# Patient Record
Sex: FEMALE | Race: WHITE | HISPANIC OR LATINO | Employment: UNEMPLOYED | URBAN - METROPOLITAN AREA
[De-identification: names, ages, dates, MRNs, and addresses within clinical notes are randomized per-mention and may not be internally consistent; named-entity substitution may affect disease eponyms.]

---

## 2017-03-03 ENCOUNTER — HOSPITAL ENCOUNTER (OUTPATIENT)
Dept: RADIOLOGY | Facility: MEDICAL CENTER | Age: 47
End: 2017-03-03
Attending: ORTHOPAEDIC SURGERY | Admitting: ORTHOPAEDIC SURGERY
Payer: MEDICAID

## 2017-03-03 DIAGNOSIS — Z01.810 PRE-OPERATIVE CARDIOVASCULAR EXAMINATION: ICD-10-CM

## 2017-03-03 DIAGNOSIS — Z01.811 PRE-OPERATIVE RESPIRATORY EXAMINATION: ICD-10-CM

## 2017-03-03 DIAGNOSIS — Z01.812 PRE-OPERATIVE LABORATORY EXAMINATION: ICD-10-CM

## 2017-03-03 LAB
ANION GAP SERPL CALC-SCNC: 7 MMOL/L (ref 0–11.9)
APPEARANCE UR: CLEAR
APTT PPP: 29.4 SEC (ref 24.7–36)
BASOPHILS # BLD AUTO: 0.7 % (ref 0–1.8)
BASOPHILS # BLD: 0.06 K/UL (ref 0–0.12)
BILIRUB UR QL STRIP.AUTO: NEGATIVE
BUN SERPL-MCNC: 12 MG/DL (ref 8–22)
CALCIUM SERPL-MCNC: 10.1 MG/DL (ref 8.5–10.5)
CHLORIDE SERPL-SCNC: 104 MMOL/L (ref 96–112)
CO2 SERPL-SCNC: 25 MMOL/L (ref 20–33)
COLOR UR: COLORLESS
CREAT SERPL-MCNC: 0.68 MG/DL (ref 0.5–1.4)
CULTURE IF INDICATED INDCX: NO UA CULTURE
EKG IMPRESSION: NORMAL
EOSINOPHIL # BLD AUTO: 0.06 K/UL (ref 0–0.51)
EOSINOPHIL NFR BLD: 0.7 % (ref 0–6.9)
ERYTHROCYTE [DISTWIDTH] IN BLOOD BY AUTOMATED COUNT: 41.1 FL (ref 35.9–50)
ERYTHROCYTE [SEDIMENTATION RATE] IN BLOOD BY WESTERGREN METHOD: 42 MM/HOUR (ref 0–20)
GFR SERPL CREATININE-BSD FRML MDRD: >60 ML/MIN/1.73 M 2
GLUCOSE SERPL-MCNC: 108 MG/DL (ref 65–99)
GLUCOSE UR STRIP.AUTO-MCNC: NEGATIVE MG/DL
HCT VFR BLD AUTO: 43.2 % (ref 37–47)
HGB BLD-MCNC: 14.5 G/DL (ref 12–16)
IMM GRANULOCYTES # BLD AUTO: 0.03 K/UL (ref 0–0.11)
IMM GRANULOCYTES NFR BLD AUTO: 0.3 % (ref 0–0.9)
INR PPP: 0.95 (ref 0.87–1.13)
KETONES UR STRIP.AUTO-MCNC: NEGATIVE MG/DL
LEUKOCYTE ESTERASE UR QL STRIP.AUTO: NEGATIVE
LYMPHOCYTES # BLD AUTO: 2.81 K/UL (ref 1–4.8)
LYMPHOCYTES NFR BLD: 31.1 % (ref 22–41)
MCH RBC QN AUTO: 29.8 PG (ref 27–33)
MCHC RBC AUTO-ENTMCNC: 33.6 G/DL (ref 33.6–35)
MCV RBC AUTO: 88.7 FL (ref 81.4–97.8)
MICRO URNS: NORMAL
MONOCYTES # BLD AUTO: 0.32 K/UL (ref 0–0.85)
MONOCYTES NFR BLD AUTO: 3.5 % (ref 0–13.4)
NEUTROPHILS # BLD AUTO: 5.75 K/UL (ref 2–7.15)
NEUTROPHILS NFR BLD: 63.7 % (ref 44–72)
NITRITE UR QL STRIP.AUTO: NEGATIVE
NRBC # BLD AUTO: 0 K/UL
NRBC BLD AUTO-RTO: 0 /100 WBC
PH UR STRIP.AUTO: 6 [PH]
PLATELET # BLD AUTO: 275 K/UL (ref 164–446)
PMV BLD AUTO: 8.9 FL (ref 9–12.9)
POTASSIUM SERPL-SCNC: 3.9 MMOL/L (ref 3.6–5.5)
PROT UR QL STRIP: NEGATIVE MG/DL
PROTHROMBIN TIME: 13 SEC (ref 12–14.6)
RBC # BLD AUTO: 4.87 M/UL (ref 4.2–5.4)
RBC UR QL AUTO: NEGATIVE
SCCMEC + MECA PNL NOSE NAA+PROBE: NEGATIVE
SCCMEC + MECA PNL NOSE NAA+PROBE: NEGATIVE
SODIUM SERPL-SCNC: 136 MMOL/L (ref 135–145)
SP GR UR STRIP.AUTO: 1.01
WBC # BLD AUTO: 9 K/UL (ref 4.8–10.8)

## 2017-03-03 PROCEDURE — 85610 PROTHROMBIN TIME: CPT

## 2017-03-03 PROCEDURE — 36415 COLL VENOUS BLD VENIPUNCTURE: CPT

## 2017-03-03 PROCEDURE — 87641 MR-STAPH DNA AMP PROBE: CPT

## 2017-03-03 PROCEDURE — 71020 DX-CHEST-2 VIEWS: CPT

## 2017-03-03 PROCEDURE — 87640 STAPH A DNA AMP PROBE: CPT | Mod: 59

## 2017-03-03 PROCEDURE — 85652 RBC SED RATE AUTOMATED: CPT

## 2017-03-03 PROCEDURE — 80048 BASIC METABOLIC PNL TOTAL CA: CPT

## 2017-03-03 PROCEDURE — 85025 COMPLETE CBC W/AUTO DIFF WBC: CPT

## 2017-03-03 PROCEDURE — 81003 URINALYSIS AUTO W/O SCOPE: CPT

## 2017-03-03 PROCEDURE — 85730 THROMBOPLASTIN TIME PARTIAL: CPT

## 2017-03-03 RX ORDER — GABAPENTIN 300 MG/1
300 CAPSULE ORAL 3 TIMES DAILY
COMMUNITY
End: 2022-12-20

## 2017-03-03 RX ORDER — IBUPROFEN 200 MG
400 TABLET ORAL EVERY 6 HOURS PRN
Status: ON HOLD | COMMUNITY
End: 2017-03-07

## 2017-03-04 NOTE — OR NURSING
"pre admit: Spoke with Shantelle at Dr Cheatham's office to clarify \"Total Joint\" order re Chlorhexidine showers and antibiotics per the total joint protocol. She said pt does not have to do the 3 day shower protocol and that the office will give her what thy want her to use. Instructed pt to call office on Monday if she has not heard from them re shower instructions. MRSA nasal swab is to be done at pre admit. Office to fax updated order re Joint Protocol changes for this patient  "

## 2017-03-07 ENCOUNTER — HOSPITAL ENCOUNTER (OUTPATIENT)
Facility: MEDICAL CENTER | Age: 47
End: 2017-03-07
Attending: ORTHOPAEDIC SURGERY | Admitting: ORTHOPAEDIC SURGERY
Payer: MEDICAID

## 2017-03-07 VITALS
WEIGHT: 172.84 LBS | OXYGEN SATURATION: 98 % | BODY MASS INDEX: 30.62 KG/M2 | HEART RATE: 76 BPM | TEMPERATURE: 98.7 F | SYSTOLIC BLOOD PRESSURE: 138 MMHG | RESPIRATION RATE: 14 BRPM | DIASTOLIC BLOOD PRESSURE: 88 MMHG | HEIGHT: 63 IN

## 2017-03-07 PROBLEM — D16.9 OSTEOCHONDROMA: Status: ACTIVE | Noted: 2017-03-07

## 2017-03-07 LAB
HCG UR QL: NEGATIVE
SP GR UR REFRACTOMETRY: 1.02

## 2017-03-07 PROCEDURE — 81025 URINE PREGNANCY TEST: CPT

## 2017-03-07 PROCEDURE — A4606 OXYGEN PROBE USED W OXIMETER: HCPCS | Performed by: ORTHOPAEDIC SURGERY

## 2017-03-07 PROCEDURE — 700111 HCHG RX REV CODE 636 W/ 250 OVERRIDE (IP)

## 2017-03-07 PROCEDURE — 160047 HCHG PACU  - EA ADDL 30 MINS PHASE II: Performed by: ORTHOPAEDIC SURGERY

## 2017-03-07 PROCEDURE — 700102 HCHG RX REV CODE 250 W/ 637 OVERRIDE(OP)

## 2017-03-07 PROCEDURE — 502240 HCHG MISC OR SUPPLY RC 0272: Performed by: ORTHOPAEDIC SURGERY

## 2017-03-07 PROCEDURE — 700101 HCHG RX REV CODE 250

## 2017-03-07 PROCEDURE — 160039 HCHG SURGERY MINUTES - EA ADDL 1 MIN LEVEL 3: Performed by: ORTHOPAEDIC SURGERY

## 2017-03-07 PROCEDURE — A9270 NON-COVERED ITEM OR SERVICE: HCPCS

## 2017-03-07 PROCEDURE — 160002 HCHG RECOVERY MINUTES (STAT): Performed by: ORTHOPAEDIC SURGERY

## 2017-03-07 PROCEDURE — 110371 HCHG SHELL REV 272: Performed by: ORTHOPAEDIC SURGERY

## 2017-03-07 PROCEDURE — 88305 TISSUE EXAM BY PATHOLOGIST: CPT

## 2017-03-07 PROCEDURE — 110382 HCHG SHELL REV 271: Performed by: ORTHOPAEDIC SURGERY

## 2017-03-07 PROCEDURE — 160048 HCHG OR STATISTICAL LEVEL 1-5: Performed by: ORTHOPAEDIC SURGERY

## 2017-03-07 PROCEDURE — 160035 HCHG PACU - 1ST 60 MINS PHASE I: Performed by: ORTHOPAEDIC SURGERY

## 2017-03-07 PROCEDURE — 160009 HCHG ANES TIME/MIN: Performed by: ORTHOPAEDIC SURGERY

## 2017-03-07 PROCEDURE — 160036 HCHG PACU - EA ADDL 30 MINS PHASE I: Performed by: ORTHOPAEDIC SURGERY

## 2017-03-07 PROCEDURE — 160028 HCHG SURGERY MINUTES - 1ST 30 MINS LEVEL 3: Performed by: ORTHOPAEDIC SURGERY

## 2017-03-07 PROCEDURE — 501838 HCHG SUTURE GENERAL: Performed by: ORTHOPAEDIC SURGERY

## 2017-03-07 PROCEDURE — 500891 HCHG PACK, ORTHO MAJOR: Performed by: ORTHOPAEDIC SURGERY

## 2017-03-07 PROCEDURE — 160025 RECOVERY II MINUTES (STATS): Performed by: ORTHOPAEDIC SURGERY

## 2017-03-07 PROCEDURE — 88311 DECALCIFY TISSUE: CPT

## 2017-03-07 PROCEDURE — 160046 HCHG PACU - 1ST 60 MINS PHASE II: Performed by: ORTHOPAEDIC SURGERY

## 2017-03-07 PROCEDURE — 500112 HCHG BONE WAX: Performed by: ORTHOPAEDIC SURGERY

## 2017-03-07 PROCEDURE — 500054 HCHG BANDAGE, ELASTIC 6: Performed by: ORTHOPAEDIC SURGERY

## 2017-03-07 RX ORDER — CELECOXIB 200 MG/1
CAPSULE ORAL
Status: COMPLETED
Start: 2017-03-07 | End: 2017-03-07

## 2017-03-07 RX ORDER — LIDOCAINE HYDROCHLORIDE 10 MG/ML
INJECTION, SOLUTION INFILTRATION; PERINEURAL
Status: COMPLETED
Start: 2017-03-07 | End: 2017-03-07

## 2017-03-07 RX ORDER — GABAPENTIN 300 MG/1
CAPSULE ORAL
Status: COMPLETED
Start: 2017-03-07 | End: 2017-03-07

## 2017-03-07 RX ORDER — CEPHALEXIN 500 MG/1
500 CAPSULE ORAL 4 TIMES DAILY
Qty: 60 CAP | Refills: 1 | Status: SHIPPED | OUTPATIENT
Start: 2017-03-07 | End: 2022-12-20

## 2017-03-07 RX ORDER — CLARITHROMYCIN 500 MG/1
500 TABLET, COATED ORAL 2 TIMES DAILY
COMMUNITY
End: 2022-12-20

## 2017-03-07 RX ORDER — OMEPRAZOLE 20 MG/1
20 CAPSULE, DELAYED RELEASE ORAL 2 TIMES DAILY
COMMUNITY
End: 2022-12-20

## 2017-03-07 RX ORDER — AMOXICILLIN 500 MG/1
500 CAPSULE ORAL 4 TIMES DAILY
COMMUNITY
End: 2022-12-20

## 2017-03-07 RX ORDER — SODIUM CHLORIDE, SODIUM LACTATE, POTASSIUM CHLORIDE, CALCIUM CHLORIDE 600; 310; 30; 20 MG/100ML; MG/100ML; MG/100ML; MG/100ML
1000 INJECTION, SOLUTION INTRAVENOUS
Status: COMPLETED | OUTPATIENT
Start: 2017-03-07 | End: 2017-03-07

## 2017-03-07 RX ORDER — OXYCODONE HCL 20 MG/1
TABLET, FILM COATED, EXTENDED RELEASE ORAL
Status: COMPLETED
Start: 2017-03-07 | End: 2017-03-07

## 2017-03-07 RX ORDER — OXYCODONE HCL 5 MG/5 ML
SOLUTION, ORAL ORAL
Status: COMPLETED
Start: 2017-03-07 | End: 2017-03-07

## 2017-03-07 RX ORDER — DIAZEPAM 5 MG/1
5 TABLET ORAL EVERY 6 HOURS PRN
Qty: 30 TAB | Refills: 1 | Status: SHIPPED | OUTPATIENT
Start: 2017-03-07 | End: 2022-12-20

## 2017-03-07 RX ORDER — BUPIVACAINE HYDROCHLORIDE AND EPINEPHRINE 5; 5 MG/ML; UG/ML
INJECTION, SOLUTION EPIDURAL; INTRACAUDAL; PERINEURAL
Status: DISCONTINUED | OUTPATIENT
Start: 2017-03-07 | End: 2017-03-07 | Stop reason: HOSPADM

## 2017-03-07 RX ORDER — RANITIDINE 150 MG/1
150 TABLET ORAL 2 TIMES DAILY
COMMUNITY
End: 2022-12-20

## 2017-03-07 RX ORDER — HYDROCODONE BITARTRATE AND ACETAMINOPHEN 10; 325 MG/1; MG/1
1-2 TABLET ORAL EVERY 4 HOURS PRN
Qty: 60 TAB | Refills: 1 | Status: SHIPPED | OUTPATIENT
Start: 2017-03-07 | End: 2022-12-20

## 2017-03-07 RX ADMIN — FENTANYL CITRATE 50 MCG: 50 INJECTION, SOLUTION INTRAMUSCULAR; INTRAVENOUS at 12:17

## 2017-03-07 RX ADMIN — FENTANYL CITRATE 50 MCG: 50 INJECTION, SOLUTION INTRAMUSCULAR; INTRAVENOUS at 12:32

## 2017-03-07 RX ADMIN — LIDOCAINE HYDROCHLORIDE: 10 INJECTION, SOLUTION INFILTRATION; PERINEURAL at 10:30

## 2017-03-07 RX ADMIN — GABAPENTIN 600 MG: 300 CAPSULE ORAL at 10:15

## 2017-03-07 RX ADMIN — HYDROMORPHONE HYDROCHLORIDE 0.5 MG: 1 INJECTION, SOLUTION INTRAMUSCULAR; INTRAVENOUS; SUBCUTANEOUS at 12:22

## 2017-03-07 RX ADMIN — SODIUM CHLORIDE, SODIUM LACTATE, POTASSIUM CHLORIDE, CALCIUM CHLORIDE 1000 ML: 600; 310; 30; 20 INJECTION, SOLUTION INTRAVENOUS at 10:30

## 2017-03-07 RX ADMIN — HYDROMORPHONE HYDROCHLORIDE 0.5 MG: 1 INJECTION, SOLUTION INTRAMUSCULAR; INTRAVENOUS; SUBCUTANEOUS at 12:48

## 2017-03-07 RX ADMIN — CELECOXIB 400 MG: 200 CAPSULE ORAL at 10:15

## 2017-03-07 RX ADMIN — OXYCODONE HYDROCHLORIDE 10 MG: 5 SOLUTION ORAL at 12:18

## 2017-03-07 RX ADMIN — OXYCODONE HYDROCHLORIDE 20 MG: 20 TABLET, FILM COATED, EXTENDED RELEASE ORAL at 10:15

## 2017-03-07 RX ADMIN — FENTANYL CITRATE 50 MCG: 50 INJECTION, SOLUTION INTRAMUSCULAR; INTRAVENOUS at 12:27

## 2017-03-07 ASSESSMENT — PAIN SCALES - GENERAL
PAINLEVEL_OUTOF10: 5
PAINLEVEL_OUTOF10: 2
PAINLEVEL_OUTOF10: 5
PAINLEVEL_OUTOF10: 2
PAINLEVEL_OUTOF10: 2
PAINLEVEL_OUTOF10: 7
PAINLEVEL_OUTOF10: 10
PAINLEVEL_OUTOF10: 5
PAINLEVEL_OUTOF10: 2
PAINLEVEL_OUTOF10: 8
PAINLEVEL_OUTOF10: 0

## 2017-03-07 NOTE — OP REPORT
DATE OF SERVICE:  03/07/2017    SERVICE:  Orthopedic surgery.    PREOPERATIVE DIAGNOSIS:  Right proximal tibia posterior popliteal bony mass,   suspected osteochondroma.    POSTOPERATIVE DIAGNOSIS:  Right proximal tibia posterior popliteal bony mass,   suspected osteochondroma.    PROCEDURE:  Excisional biopsy of right proximal tibia posterior popliteal bony   mass, suspected osteochondroma.    SURGEON:  Kaden Cheatham MD    ASSISTANT SURGEON:  Ross Bravo MD    ANESTHETIC:  General.    ANESTHESIOLOGIST:  Cassandra Khan MD    COMPLICATIONS:  None.    BLOOD LOSS:  Minimal.    TOURNIQUET TIME:  22 minutes.    HISTORY AND INDICATIONS:  The patient is a 46-year-old female with chief   complaint of severe persistent knee pain secondary to large bony mass.  This   mass was in a very precipitous area in the popliteal fossa.  Multiple attempts   were made by the patient in the past to have the surgeon remove this, but no   surgeon has agreed to up to this point.  I did explain to the patient that   this is an extremely dangerous location with significant incidence of   neurovascular compromise.  She understood this, but simply was unable to   tolerate persistent symptoms and because of this, today's surgery was   conducted.    DESCRIPTION OF PROCEDURE:  After informed consent was obtained, the patient   was brought to the operating room and given general endotracheal anesthetic.    She was placed in the prone position and her right lower extremity was prepped   and draped in the usual sterile fashion after Ancef and vancomycin were   given.  After the field was draped, a timeout was called correctly identifying   the patient and the procedure to be done.  The limb was then exsanguinated   using gravity and her tourniquet was inflated to 250 mmHg.  We then made a   curvilinear incision overlying the patient's mass, which was deeply palpable.    We then carefully dissected down through subcutaneous tissue through the fat    layer.  We then arrived at the popliteal fossa.  A Lara elevator was used to   carefully clean off the popliteal fascia.  We then incised the fascia   proximally and carefully with the dissecting scissors extended this incision   distally.  We then arrived at the level of the gastroc soleus fascia.  This   again was then sized to a very small 2 mm incision proximally.  This incision   was carried distally.  We then placed a deep retractor and then carefully   dissected through muscular tissue being very carefully to go very slowly.  We   did encounter 2 smaller veins, which Bovie cauterization was used to   cauterize.  We then carefully dissected down deeper through muscular tissue   being careful to avoid any neurovascular structures.  We then arrived at the   tumor.  We then dissected essentially along with surface of the tumor and then   placed Hohmann retractors along with Driver retractors both proximally   and distally exposing the entire tumor including the stalk.  An osteotome   curved 3/4-inch was then used to transect the stalk of the tumor at the level   of the normal cortex.  This tumor was completely excised.  A rongeur was used   to remove any roughened edges as well.  Digital inspection was then done.  We   were flushed with the surface of the adjacent cortex.  After irrigating, we   then pressed bone wax into the open cancellous bone.  The tourniquet was then   let down.  Only very small bleeding vessels were cauterized.  There was very   little bleeding, in fact blood loss was probably less than 5 mL.  We then left   the tourniquet down for the rest of the procedure and we did several rounds   of placing a lap sponge, inspecting the wound, cauterized any bleeding.  At   the end of the operation, at least 5 minutes passed with no evident bleeding   whatsoever.  Because of this, we then closed the wound in layers.  The gastroc   soleus fascia was closed with #1 Vicryl suture.  The popliteal  fascia was   then closed with #1 suture.  Subcutaneous tissue was closed with 2-0 Vicryl.    Skin closure was completed with a 3-0 Monocryl suture, Benzoin and   Steri-Strips.  A 10 mL of 0.5% Marcaine with epinephrine was injected into the   wound as local anesthetic.  Wound dressing was applied and the procedure was   terminated.  Again, the tourniquet was let down long before the wound was   closed.  The patient was then aroused from general anesthesia and brought in   stable condition to recovery room.  Blood loss was minimal.       ____________________________________     MD RAGHU ANDRES / TANO    DD:  03/07/2017 11:50:16  DT:  03/07/2017 13:26:04    D#:  [id number]  Job#:  [id number]

## 2017-03-07 NOTE — DISCHARGE INSTRUCTIONS
[facility]  ACTIVITY: Rest and take it easy for the first 24 hours.  A responsible adult is recommended to remain with you during that time.  It is normal to feel sleepy.  We encourage you to not do anything that requires balance, judgment or coordination.    MILD FLU-LIKE SYMPTOMS ARE NORMAL. YOU MAY EXPERIENCE GENERALIZED MUSCLE ACHES, THROAT IRRITATION, HEADACHE AND/OR SOME NAUSEA.    FOR 24 HOURS DO NOT:  Drive, operate machinery or run household appliances.  Drink beer or alcoholic beverages.   Make important decisions or sign legal documents.    SPECIAL INSTRUCTIONS:     Remove dressing in 3 days   rtc phoebe 2 weeks      DIET: To avoid nausea, slowly advance diet as tolerated, avoiding spicy or greasy foods for the first day.  Add more substantial food to your diet according to your physician's instructions.  Babies can be fed formula or breast milk as soon as they are hungry.  INCREASE FLUIDS AND FIBER TO AVOID CONSTIPATION.    SURGICAL DRESSING/BATHING: See above. Call MD with any questions.     FOLLOW-UP APPOINTMENT:  A follow-up appointment should be arranged with your doctor in 2 weeks; call to schedule.    You should CALL YOUR PHYSICIAN if you develop:  Fever greater than 101 degrees F.  Pain not relieved by medication, or persistent nausea or vomiting.  Excessive bleeding (blood soaking through dressing) or unexpected drainage from the wound.  Extreme redness or swelling around the incision site, drainage of pus or foul smelling drainage.  Inability to urinate or empty your bladder within 8 hours.  Problems with breathing or chest pain.    You should call 911 if you develop problems with breathing or chest pain.  If you are unable to contact your doctor or surgical center, you should go to the nearest emergency room or urgent care center.  Physician's telephone #: Dr. Cheatham [phone].    If any questions arise, call your doctor.  If your doctor is not available, please feel free to call the Surgical Center  at [phone].  The Center is open Monday through Friday from 7AM to 7PM.  You can also call the HEALTH HOTLINE open 24 hours/day, 7 days/week and speak to a nurse at [phone], or toll free at [phone].    A registered nurse may call you a few days after your surgery to see how you are doing after your procedure.    MEDICATIONS: Resume taking daily medication.  Take prescribed pain medication with food.  If no medication is prescribed, you may take non-aspirin pain medication if needed.  PAIN MEDICATION CAN BE VERY CONSTIPATING.  Take a stool softener or laxative such as senokot, pericolace, or milk of magnesia if needed.    Prescription given for Norco.  Last pain medication given at 12:18pm. (Can have next pain medication at 4:18pm).     If your physician has prescribed pain medication that includes Acetaminophen (Tylenol), do not take additional Acetaminophen (Tylenol) while taking the prescribed medication.    Depression / Suicide Risk    As you are discharged from this [facility] facility, it is important to learn how to keep safe from harming yourself.    Recognize the warning signs:  · Abrupt changes in personality, positive or negative- including increase in energy   · Giving away possessions  · Change in eating patterns- significant weight changes-  positive or negative  · Change in sleeping patterns- unable to sleep or sleeping all the time   · Unwillingness or inability to communicate  · Depression  · Unusual sadness, discouragement and loneliness  · Talk of wanting to die  · Neglect of personal appearance   · Rebelliousness- reckless behavior  · Withdrawal from people/activities they love  · Confusion- inability to concentrate     If you or a loved one observes any of these behaviors or has concerns about self-harm, here's what you can do:  · Talk about it- your feelings and reasons for harming yourself  · Remove any means that you might use to hurt yourself (examples: pills, rope,  extension cords, firearm)  · Get professional help from the community (Mental Health, Substance Abuse, psychological counseling)  · Do not be alone:Call your Safe Contact- someone whom you trust who will be there for you.  · Call your local CRISIS HOTLINE 227-9613 or 573-550-4835  · Call your local Children's Mobile Crisis Response Team [place] (278) 409-3745 or www.Handipoints  · Call the toll free National Suicide Prevention Hotlines   · National Suicide Prevention Lifeline 218-680-KEZM (6868)  · National Hope Line Network 800-SUICIDE (387-0350)

## 2017-03-07 NOTE — IP AVS SNAPSHOT
" Home Care Instructions                                                                                                                Name:Laurie Enriquez  Medical Record Number:[id number]  CSN: [id number]    YOB: 1970   Age: 46 y.o.  Sex: female  HT:1.6 m (5' 3\") WT: 78.4 kg (172 lb 13.5 oz)          Admit Date: 3/7/2017     Discharge Date:   Today's Date: 3/7/2017  Attending Doctor:  Kaden Cheatham M.D.                  Allergies:  Nkda                Discharge Instructions       Norc  ACTIVITY: Rest and take it easy for the first 24 hours.  A responsible adult is recommended to remain with you during that time.  It is normal to feel sleepy.  We encourage you to not do anything that requires balance, judgment or coordination.    MILD FLU-LIKE SYMPTOMS ARE NORMAL. YOU MAY EXPERIENCE GENERALIZED MUSCLE ACHES, THROAT IRRITATION, HEADACHE AND/OR SOME NAUSEA.    FOR 24 HOURS DO NOT:  Drive, operate machinery or run household appliances.  Drink beer or alcoholic beverages.   Make important decisions or sign legal documents.    SPECIAL INSTRUCTIONS:     Remove dressing in 3 days   rtc phoebe 2 weeks      DIET: To avoid nausea, slowly advance diet as tolerated, avoiding spicy or greasy foods for the first day.  Add more substantial food to your diet according to your physician's instructions.  Babies can be fed formula or breast milk as soon as they are hungry.  INCREASE FLUIDS AND FIBER TO AVOID CONSTIPATION.    SURGICAL DRESSING/BATHING: See above. Call MD with any questions.     FOLLOW-UP APPOINTMENT:  A follow-up appointment should be arranged with your doctor in 2 weeks; call to schedule.    You should CALL YOUR PHYSICIAN if you develop:  Fever greater than 101 degrees F.  Pain not relieved by medication, or persistent nausea or vomiting.  Excessive bleeding (blood soaking through dressing) or unexpected drainage from the wound.  Extreme redness or swelling around the incision site, drainage of pus or " foul smelling drainage.  Inability to urinate or empty your bladder within 8 hours.  Problems with breathing or chest pain.    You should call 911 if you develop problems with breathing or chest pain.  If you are unable to contact your doctor or surgical center, you should go to the nearest emergency room or urgent care center.  Physician's telephone #: Dr. Cheatham [phone].    If any questions arise, call your doctor.  If your doctor is not available, please feel free to call the Surgical Center at [phone].  The Center is open Monday through Friday from 7AM to 7PM.  You can also call the [facility] HOTLINE open 24 hours/day, 7 days/week and speak to a nurse at [phone], or toll free at [phone].    A registered nurse may call you a few days after your surgery to see how you are doing after your procedure.    MEDICATIONS: Resume taking daily medication.  Take prescribed pain medication with food.  If no medication is prescribed, you may take non-aspirin pain medication if needed.  PAIN MEDICATION CAN BE VERY CONSTIPATING.  Take a stool softener or laxative such as senokot, pericolace, or milk of magnesia if needed.    Prescription given for Norco.  Last pain medication given at 12:18pm. (Can have next pain medication at 4:18pm).     If your physician has prescribed pain medication that includes Acetaminophen (Tylenol), do not take additional Acetaminophen (Tylenol) while taking the prescribed medication.    Depression / Suicide Risk    As you are discharged from this [facility] facility, it is important to learn how to keep safe from harming yourself.    Recognize the warning signs:  · Abrupt changes in personality, positive or negative- including increase in energy   · Giving away possessions  · Change in eating patterns- significant weight changes-  positive or negative  · Change in sleeping patterns- unable to sleep or sleeping all the time   · Unwillingness or inability to  communicate  · Depression  · Unusual sadness, discouragement and loneliness  · Talk of wanting to die  · Neglect of personal appearance   · Rebelliousness- reckless behavior  · Withdrawal from people/activities they love  · Confusion- inability to concentrate     If you or a loved one observes any of these behaviors or has concerns about self-harm, here's what you can do:  · Talk about it- your feelings and reasons for harming yourself  · Remove any means that you might use to hurt yourself (examples: pills, rope, extension cords, firearm)  · Get professional help from the community (Mental Health, Substance Abuse, psychological counseling)  · Do not be alone:Call your Safe Contact- someone whom you trust who will be there for you.  · Call your local CRISIS HOTLINE 627-3281 or 711-437-1056  · Call your local Children's Mobile Crisis Response Team [place] (120) 034-1066 or www.PSafe  · Call the toll free National Suicide Prevention Hotlines   · National Suicide Prevention Lifeline 959-714-YNHR (2790)  · [place] Hope Line Network 800-SUICIDE (269-5132)       Medication List      START taking these medications        Instructions    cephALEXin 500 MG Caps   Commonly known as:  KEFLEX    Take 1 Cap by mouth 4 times a day.   Dose:  500 mg       diazepam 5 MG Tabs   Commonly known as:  VALIUM    Take 1 Tab by mouth every 6 hours as needed (muscle spasm or insomnia).   Dose:  5 mg       hydrocodone/acetaminophen  MG Tabs   Commonly known as:  NORCO    Take 1-2 Tabs by mouth every four hours as needed for Moderate Pain (do not take with tylenol).   Dose:  1-2 Tab         CONTINUE taking these medications        Instructions    Acetaminophen 650 MG Tabs    Doctor's comments:  Over-the-counter Acetaminophen/Tylenol is okay   Take 650 mg by mouth every 6 hours as needed for Mild Pain or Moderate Pain.   Dose:  650 mg       amoxicillin 500 MG Caps   Commonly known as:  AMOXIL    Take 500 mg by mouth 4  times a day. 14 day course for stomach infection   Dose:  500 mg       clarithromycin 500 MG Tabs   Commonly known as:  BIAXIN    Take 500 mg by mouth 2 times a day. 14 day course for H Pylori infection   Dose:  500 mg       gabapentin 300 MG Caps   Commonly known as:  NEURONTIN    Take 300 mg by mouth 3 times a day.   Dose:  300 mg       omeprazole 20 MG delayed-release capsule   Commonly known as:  PRILOSEC    Take 20 mg by mouth 2 times a day.   Dose:  20 mg       ranitidine 150 MG Tabs   Commonly known as:  ZANTAC    Take 150 mg by mouth 2 times a day.   Dose:  150 mg               Medication Information     Above and/or attached are the medications your physician expects you to take upon discharge. Review all of your home medications and newly ordered medications with your doctor and/or pharmacist. Follow medication instructions as directed by your doctor and/or pharmacist. Please keep your medication list with you and share with your physician. Update the information when medications are discontinued, doses are changed, or new medications (including over-the-counter products) are added; and carry medication information at all times in the event of emergency situations.        Resources     Quit Smoking / Tobacco Use:    I understand the use of any tobacco products increases my chance of suffering from future heart disease or stroke and could cause other illnesses which may shorten my life. Quitting the use of tobacco products is the single most important thing I can do to improve my health. For further information on smoking / tobacco cessation call a Toll Free Quit Line at 1-213.405.6929 (*[employer] [place]) or 1-996.440.9424 (American Lung Association) or you can access the web based program at www.lungusa.org.    [place] Tobacco Users Help Line:  (885) 147-7678       Toll Free: 1-978.681.9144  Quit Tobacco Program [facility] (376)336-6036    Crisis Hotline:    National Crisis  Hotline:  0-184-AMLCQPG or 1-921.101.9195    [place] Crisis Hotline:    1-797.127.7141 or 936-181-0708    Discharge Survey:   Thank you for choosing [facility]. We hope we did everything we could to make your hospital stay a pleasant one. You may be receiving a survey and we would appreciate your time and participation in answering the questions. Your input is very valuable to us in our efforts to improve our service to our patients and their families.            Signatures     My signature on this form indicates that:    1. I acknowledge receipt and understanding of these Home Care Instruction.  2. My questions regarding this information have been answered to my satisfaction.  3. I have formulated a plan with my discharge nurse to obtain my prescribed medications for home.    __________________________________      __________________________________                   Patient Signature                                 Guardian/Responsible Adult Signature      __________________________________                 __________       ________                       Nurse Signature                                               Date                 Time

## 2017-03-07 NOTE — IP AVS SNAPSHOT
3/7/2017          Laurie Enriquez  [address] [place] [address] [address]  [place] [address]    Dear Laurie:    [facility] wants to ensure your discharge home is safe and you or your loved ones have had all your questions answered regarding your care after you leave the hospital.    You may receive a telephone call within two days of your discharge.  This call is to make certain you understand your discharge instructions as well as ensure we provided you with the best care possible during your stay with us.     The call will only last approximately 3-5 minutes and will be done by a nurse.    Once again, we want to ensure your discharge home is safe and that you have a clear understanding of any next steps in your care.  If you have any questions or concerns, please do not hesitate to contact us, we are here for you.  Thank you for choosing [facility] for your healthcare needs.    Sincerely,    Osmar Cheatham    [facility]

## 2017-03-07 NOTE — OR SURGEON
Immediate Post-Operative Note      PreOp Diagnosis: r proximal tibia posterior mass suspected ostechondroma    PostOp Diagnosis: same    Procedure(s):  MASS EXCISION ORTHO - POSTERIOR KNEE TUMOR REMOVAL - Wound Class: Clean    Surgeon(s):  HIRA Prieto M.D.    Anesthesiologist/Type of Anesthesia:  Anesthesiologist: Cassandra Khan M.D./General    Surgical Staff:  Circulator: Rudi Durand R.N.  Scrub Person: Yulissa Castillo    Specimen: yes mass sent for permanant section    Estimated Blood Loss: less than 5 cc    Findings: expected mass    Complications: none        3/7/2017 11:51 AM Kaden Cheatham

## 2017-08-05 ENCOUNTER — HOSPITAL ENCOUNTER (EMERGENCY)
Facility: MEDICAL CENTER | Age: 47
End: 2017-08-05
Attending: EMERGENCY MEDICINE
Payer: MEDICAID

## 2017-08-05 ENCOUNTER — APPOINTMENT (OUTPATIENT)
Dept: RADIOLOGY | Facility: MEDICAL CENTER | Age: 47
End: 2017-08-05
Attending: EMERGENCY MEDICINE
Payer: MEDICAID

## 2017-08-05 VITALS
WEIGHT: 174.6 LBS | OXYGEN SATURATION: 94 % | SYSTOLIC BLOOD PRESSURE: 133 MMHG | RESPIRATION RATE: 15 BRPM | TEMPERATURE: 97.9 F | HEART RATE: 71 BPM | BODY MASS INDEX: 30.94 KG/M2 | DIASTOLIC BLOOD PRESSURE: 86 MMHG | HEIGHT: 63 IN

## 2017-08-05 DIAGNOSIS — M54.50 ACUTE LOW BACK PAIN WITHOUT SCIATICA, UNSPECIFIED BACK PAIN LATERALITY: ICD-10-CM

## 2017-08-05 PROCEDURE — 72070 X-RAY EXAM THORAC SPINE 2VWS: CPT

## 2017-08-05 PROCEDURE — A9270 NON-COVERED ITEM OR SERVICE: HCPCS | Performed by: EMERGENCY MEDICINE

## 2017-08-05 PROCEDURE — 700102 HCHG RX REV CODE 250 W/ 637 OVERRIDE(OP): Performed by: EMERGENCY MEDICINE

## 2017-08-05 PROCEDURE — 72100 X-RAY EXAM L-S SPINE 2/3 VWS: CPT

## 2017-08-05 PROCEDURE — 99284 EMERGENCY DEPT VISIT MOD MDM: CPT

## 2017-08-05 RX ORDER — NAPROXEN 500 MG/1
500 TABLET ORAL ONCE
Status: COMPLETED | OUTPATIENT
Start: 2017-08-05 | End: 2017-08-05

## 2017-08-05 RX ORDER — HYDROCODONE BITARTRATE AND ACETAMINOPHEN 5; 325 MG/1; MG/1
1 TABLET ORAL ONCE
Status: COMPLETED | OUTPATIENT
Start: 2017-08-05 | End: 2017-08-05

## 2017-08-05 RX ORDER — NAPROXEN 500 MG/1
500 TABLET ORAL 2 TIMES DAILY WITH MEALS
Qty: 30 TAB | Refills: 1 | Status: SHIPPED | OUTPATIENT
Start: 2017-08-05 | End: 2022-12-20

## 2017-08-05 RX ORDER — OXYCODONE HYDROCHLORIDE AND ACETAMINOPHEN 5; 325 MG/1; MG/1
1-2 TABLET ORAL EVERY 4 HOURS PRN
Qty: 15 TAB | Refills: 0 | Status: SHIPPED | OUTPATIENT
Start: 2017-08-05 | End: 2022-12-20

## 2017-08-05 RX ADMIN — NAPROXEN 500 MG: 500 TABLET ORAL at 19:59

## 2017-08-05 RX ADMIN — HYDROCODONE BITARTRATE AND ACETAMINOPHEN 1 TABLET: 5; 325 TABLET ORAL at 20:00

## 2017-08-05 ASSESSMENT — ENCOUNTER SYMPTOMS
NECK PAIN: 0
BACK PAIN: 1

## 2017-08-05 NOTE — ED AVS SNAPSHOT
Home Care Instructions                                                                                                                Laurie Enriquez   MRN: [id number]    Department:  [facility], Emergency Dept   Date of Visit:  8/5/2017            [facility], Emergency Dept    [address]    [place] [address]    Phone:  [phone]      You were seen by     Sindy Cunningham M.D.      Your Diagnosis Was     Acute low back pain without sciatica, unspecified back pain laterality     M54.5       These are the medications you received during your hospitalization from 08/05/2017 1735 to 08/05/2017 2058     Date/Time Order Dose Route Action    08/05/2017 1959 naproxen (NAPROSYN) tablet 500 mg 500 mg Oral Given    08/05/2017 2000 hydrocodone-acetaminophen (NORCO) 5-325 MG per tablet 1 Tab 1 Tab Oral Given      Follow-up Information     1. Schedule an appointment as soon as possible for a visit with Pcp Pt States None.    Specialty:  Family Medicine      Medication Information     Review all of your home medications and newly ordered medications with your primary doctor and/or pharmacist as soon as possible. Follow medication instructions as directed by your doctor and/or pharmacist.     Please keep your complete medication list with you and share with your physician. Update the information when medications are discontinued, doses are changed, or new medications (including over-the-counter products) are added; and carry medication information at all times in the event of emergency situations.               Medication List      START taking these medications        Instructions    Morning Afternoon Evening Bedtime    naproxen 500 MG Tabs   Last time this was given:  500 mg on 8/5/2017  7:59 PM   Commonly known as:  NAPROSYN        Take 1 Tab by mouth 2 times a day, with meals.   Dose:  500 mg                        oxycodone-acetaminophen 5-325 MG Tabs   Commonly known as:   PERCOCET        Take 1-2 Tabs by mouth every four hours as needed.   Dose:  1-2 Tab                          ASK your doctor about these medications        Instructions    Morning Afternoon Evening Bedtime    Acetaminophen 650 MG Tabs        Doctor's comments:  Over-the-counter Acetaminophen/Tylenol is okay   Take 650 mg by mouth every 6 hours as needed for Mild Pain or Moderate Pain.   Dose:  650 mg                        amoxicillin 500 MG Caps   Commonly known as:  AMOXIL        Take 500 mg by mouth 4 times a day. 14 day course for stomach infection   Dose:  500 mg                        cephALEXin 500 MG Caps   Commonly known as:  KEFLEX        Take 1 Cap by mouth 4 times a day.   Dose:  500 mg                        clarithromycin 500 MG Tabs   Commonly known as:  BIAXIN        Take 500 mg by mouth 2 times a day. 14 day course for H Pylori infection   Dose:  500 mg                        diazepam 5 MG Tabs   Commonly known as:  VALIUM        Take 1 Tab by mouth every 6 hours as needed (muscle spasm or insomnia).   Dose:  5 mg                        gabapentin 300 MG Caps   Commonly known as:  NEURONTIN        Take 300 mg by mouth 3 times a day.   Dose:  300 mg                        hydrocodone/acetaminophen  MG Tabs   Commonly known as:  NORCO        Take 1-2 Tabs by mouth every four hours as needed for Moderate Pain (do not take with tylenol).   Dose:  1-2 Tab                        omeprazole 20 MG delayed-release capsule   Commonly known as:  PRILOSEC        Take 20 mg by mouth 2 times a day.   Dose:  20 mg                        ranitidine 150 MG Tabs   Commonly known as:  ZANTAC        Take 150 mg by mouth 2 times a day.   Dose:  150 mg                             Where to Get Your Medications      You can get these medications from any pharmacy     Bring a paper prescription for each of these medications    - naproxen 500 MG Tabs  - oxycodone-acetaminophen 5-325 MG Tabs            Procedures and  tests performed during your visit     DX-LUMBAR SPINE-2 OR 3 VIEWS    DX-THORACIC SPINE-2 VIEWS        Discharge Instructions       Back Exercises  Back exercises help treat and prevent back injuries. The goal of back exercises is to increase the strength of your abdominal and back muscles and the flexibility of your back. These exercises should be started when you no longer have back pain. Back exercises include:  · Pelvic Tilt. Lie on your back with your knees bent. Tilt your pelvis until the lower part of your back is against the floor. Hold this position 5 to 10 sec and repeat 5 to 10 times.  · Knee to Chest. Pull first 1 knee up against your chest and hold for 20 to 30 seconds, repeat this with the other knee, and then both knees. This may be done with the other leg straight or bent, whichever feels better.  · Sit-Ups or Curl-Ups. Bend your knees 90 degrees. Start with tilting your pelvis, and do a partial, slow sit-up, lifting your trunk only 30 to 45 degrees off the floor. Take at least 2 to 3 seconds for each sit-up. Do not do sit-ups with your knees out straight. If partial sit-ups are difficult, simply do the above but with only tightening your abdominal muscles and holding it as directed.  · Hip-Lift. Lie on your back with your knees flexed 90 degrees. Push down with your feet and shoulders as you raise your hips a couple inches off the floor; hold for 10 seconds, repeat 5 to 10 times.  · Back arches. Lie on your stomach, propping yourself up on bent elbows. Slowly press on your hands, causing an arch in your low back. Repeat 3 to 5 times. Any initial stiffness and discomfort should lessen with repetition over time.  · Shoulder-Lifts. Lie face down with arms beside your body. Keep hips and torso pressed to floor as you slowly lift your head and shoulders off the floor.  Do not overdo your exercises, especially in the beginning. Exercises may cause you some mild back discomfort which lasts for a few  minutes; however, if the pain is more severe, or lasts for more than 15 minutes, do not continue exercises until you see your caregiver. Improvement with exercise therapy for back problems is slow.   See your caregivers for assistance with developing a proper back exercise program.     This information is not intended to replace advice given to you by your health care provider. Make sure you discuss any questions you have with your health care provider.     Document Released: 01/25/2006 Document Revised: 03/11/2013 Document Reviewed: 02/11/2016  Socialtext Interactive Patient Education ©2016 [employer]    Back Pain, Adult  Back pain is very common in adults. The cause of back pain is rarely dangerous and the pain often gets better over time. The cause of your back pain may not be known. Some common causes of back pain include:  · Strain of the muscles or ligaments supporting the spine.  · Wear and tear (degeneration) of the spinal disks.  · Arthritis.  · Direct injury to the back.  For many people, back pain may return. Since back pain is rarely dangerous, most people can learn to manage this condition on their own.  HOME CARE INSTRUCTIONS  Watch your back pain for any changes. The following actions may help to lessen any discomfort you are feeling:  · Remain active. It is stressful on your back to sit or  one place for long periods of time. Do not sit, drive, or  one place for more than 30 minutes at a time. Take short walks on even surfaces as soon as you are able. Try to increase the length of time you walk each day.  · Exercise regularly as directed by your health care provider. Exercise helps your back heal faster. It also helps avoid future injury by keeping your muscles strong and flexible.  · Do not stay in bed. Resting more than 1-2 days can delay your recovery.  · Pay attention to your body when you bend and lift. The most comfortable positions are those that put less stress on your  recovering back. Always use proper lifting techniques, including:  ¨ Bending your knees.  ¨ Keeping the load close to your body.  ¨ Avoiding twisting.  · Find a comfortable position to sleep. Use a firm mattress and lie on your side with your knees slightly bent. If you lie on your back, put a pillow under your knees.  · Avoid feeling anxious or stressed. Stress increases muscle tension and can worsen back pain. It is important to recognize when you are anxious or stressed and learn ways to manage it, such as with exercise.  · Take medicines only as directed by your health care provider. Over-the-counter medicines to reduce pain and inflammation are often the most helpful. Your health care provider may prescribe muscle relaxant drugs. These medicines help dull your pain so you can more quickly return to your normal activities and healthy exercise.  · Apply ice to the injured area:  ¨ Put ice in a plastic bag.  ¨ Place a towel between your skin and the bag.  ¨ Leave the ice on for 20 minutes, 2-3 times a day for the first 2-3 days. After that, ice and heat may be alternated to reduce pain and spasms.  · Maintain a healthy weight. Excess weight puts extra stress on your back and makes it difficult to maintain good posture.  SEEK MEDICAL CARE IF:  · You have pain that is not relieved with rest or medicine.  · You have increasing pain going down into the legs or buttocks.  · You have pain that does not improve in one week.  · You have night pain.  · You lose weight.  · You have a fever or chills.  SEEK IMMEDIATE MEDICAL CARE IF:   · You develop new bowel or bladder control problems.  · You have unusual weakness or numbness in your arms or legs.  · You develop nausea or vomiting.  · You develop abdominal pain.  · You feel faint.     This information is not intended to replace advice given to you by your health care provider. Make sure you discuss any questions you have with your health care provider.     Document  Released: 12/18/2006 Document Revised: 01/08/2016 Document Reviewed: 04/21/2015  Elsevier Interactive Patient Education ©2016 [employer]            Patient Information     Patient Information    Following emergency treatment: all patient requiring follow-up care must return either to a private physician or a clinic if your condition worsens before you are able to obtain further medical attention, please return to the emergency room.     Billing Information    At [facility], we work to make the billing process streamlined for our patients.  Our Representatives are here to answer any questions you may have regarding your hospital bill.  If you have insurance coverage and have supplied your insurance information to us, we will submit a claim to your insurer on your behalf.  Should you have any questions regarding your bill, we can be reached online or by phone as follows:  Online: You are able pay your bills online or live chat with our representatives about any billing questions you may have. We are here to help Monday - Friday from 8:00am to 7:30pm and 9:00am - 12:00pm on Saturdays.  Please visit [email or web address]  for more information.   Phone:  [phone] or [phone]    Please note that your emergency physician, surgeon, pathologist, radiologist, anesthesiologist, and other specialists are not employed by [facility] and will therefore bill separately for their services.  Please contact them directly for any questions concerning their bills at the numbers below:     Emergency Physician Services:  [phone]  [facility]:  [phone]  [facility]:  [phone]  [facility]:  [phone]    1. Your final bill may vary from the amount quoted upon discharge if all procedures are not complete at that time, or if your doctor has additional procedures of which we are not aware. You will receive an additional bill if you  return to the Emergency Department at [facility] for suture removal regardless of the facility of which the sutures were placed.     2. Please arrange for settlement of this account at the emergency registration.    3. All self-pay accounts are due in full at the time of treatment.  If you are unable to meet this obligation then payment is expected within 4-5 days.     4. If you have had radiology studies (CT, X-ray, Ultrasound, MRI), you have received a preliminary result during your emergency department visit. Please contact the radiology department [phone] to receive a copy of your final result. Please discuss the Final result with your primary physician or with the follow up physician provided.     Crisis Hotline:  [place] Crisis Hotline:  9-907-BWPRGGG or 1-727.821.3944  [place] Crisis Hotline:    1-940.534.4449 or 561-730-1458         ED Discharge Follow Up Questions    1. In order to provide you with very good care, we would like to follow up with a phone call in the next few days.  May we have your permission to contact you?     YES /  NO    2. What is the best phone number to call you? (       )_____-__________    3. What is the best time to call you?      Morning  /  Afternoon  /  Evening                   Patient Signature:  ____________________________________________________________    Date:  ____________________________________________________________

## 2017-08-05 NOTE — ED AVS SNAPSHOT
CLARED Access Code: [email or web address]  Expires: 9/4/2017  8:57 PM    Your email address is not on file at [facility].  Email Addresses are required for you to sign up for CLARED, please contact [phone] to verify your personal information and to provide your email address prior to attempting to register for CLARED.    Laurie Enriquez  [address] [place] [address] [place] [address]    CLARED  A secure, online tool to manage your health information     [facility]’s CLARED® is a secure, online tool that connects you to your personalized health information from the privacy of your home -- day or night - making it very easy for you to manage your healthcare. Once the activation process is completed, you can even access your medical information using the CLARED bong, which is available for free in the Apple Bong store or Google Play store.     To learn more about CLARED, visit [email or web address]    There are two levels of access available (as shown below):   My Chart Features  [facility] Primary Care Doctor [facility]  Specialists [facility]  Urgent  Care Non-[facility] Primary Care Doctor   Email your healthcare team securely and privately 24/7 X X X    Manage appointments: schedule your next appointment; view details of past/upcoming appointments X      Request prescription refills. X      View recent personal medical records, including lab and immunizations X X X X   View health record, including health history, allergies, medications X X X X   Read reports about your outpatient visits, procedures, consult and ER notes X X X X   See your discharge summary, which is a recap of your hospital and/or ER visit that includes your diagnosis, lab results, and care plan X X  X     How to register for GlobaTre[employer]:  Once your e-mail address has been verified, follow the following steps to sign up for CLARED.     1. Go to  [email or web address]  2. Click on the Sign Up Now box, which takes you to the New Member Sign Up page.  You will need to provide the following information:  a. Enter your Motiga Access Code exactly as it appears at the top of this page. (You will not need to use this code after you’ve completed the sign-up process. If you do not sign up before the expiration date, you must request a new code.)   b. Enter your date of birth.   c. Enter your home email address.   d. Click Submit, and follow the next screen’s instructions.  3. Create a Zappert ID. This will be your Motiga login ID and cannot be changed, so think of one that is secure and easy to remember.  4. Create a Motiga password. You can change your password at any time.  5. Enter your Password Reset Question and Answer. This can be used at a later time if you forget your password.   6. Enter your e-mail address. This allows you to receive e-mail notifications when new information is available in Motiga.  7. Click Sign Up. You can now view your health information.    For assistance activating your Motiga account, call [phone]

## 2017-08-05 NOTE — ED AVS SNAPSHOT
8/5/2017    Laurie Enriquez  [address] [place] [address] [address]  [place] [address]    Dear Laurie:    Critical access hospital wants to ensure your discharge home is safe and you or your loved ones have had all of your questions answered regarding your care after you leave the hospital.    Below is a list of resources and contact information should you have any questions regarding your hospital stay, follow-up instructions, or active medical symptoms.    Questions or Concerns Regarding… Contact   Medical Questions Related to Your Discharge  (7 days a week, 8am-5pm) Contact a Nurse Care Coordinator   [phone]   Medical Questions Not Related to Your Discharge  (24 hours a day / 7 days a week)  Contact the Nurse Health Line   [phone]    Medications or Discharge Instructions Refer to your discharge packet   or contact your [facility] Primary Care Provider   [phone]   Follow-up Appointment(s) Schedule your appointment via SCREEMO   or contact Scheduling [phone]   Billing Review your statement via SCREEMO  or contact Billing [phone]   Medical Records Review your records via SCREEMO   or contact Medical Records [phone]     You may receive a telephone call within two days of discharge. This call is to make certain you understand your discharge instructions and have the opportunity to have any questions answered. You can also easily access your medical information, test results and upcoming appointments via the SCREEMO free online health management tool. You can learn more and sign up at [email or web address]. For assistance setting up your SCREEMO account, please call [phone].    Once again, we want to ensure your discharge home is safe and that you have a clear understanding of any next steps in your care. If you have any questions or concerns, please do not hesitate to contact us, we are here for you. Thank you for choosing [facility] for your healthcare needs.    Sincerely,    Your [facility] Healthcare Team

## 2017-08-05 NOTE — LETTER
"  FORM C-4:  EMPLOYEE’S CLAIM FOR COMPENSATION/ REPORT OF INITIAL TREATMENT  EMPLOYEE’S CLAIM - PROVIDE ALL INFORMATION REQUESTED   First Name  Laurie Last Name  Mina Birthdate             Age  1970 47 y.o. Sex  female Claim Number   Home Employee Address  [address]  [facility]                                     Zip  [address] Height  1.6 m (5' 2.99\") Weight  79.2 kg (174 lb 9.7 oz) [facility]  [id number]   Mailing Employee Address                           [address]   [facility]               Zip  [address] Telephone  [phone] (home)  Primary Language Spoken  ENGLISH   Insurer  *** Third Party   MEDICAID HMO Employee's Occupation (Job Title) When Injury or Occupational Disease Occurred     Employer's Name   Telephone      Employer Address   City   State   Zip     Date of Injury         Hour of Injury   Date Employer Notified   Last Day of Work after Injury or Occupational Disease   Supervisor to Whom Injury Reported     Address or Location of Accident (if applicable)     What were you doing at the time of accident? (if applicable)      How did this injury or occupational disease occur? Be specific and answer in detail. Use additional sheet if necessary)     If you believe that you have an occupational disease, when did you first have knowledge of the disability and it relationship to your employment?   Witnesses to the Accident       Nature of Injury or Occupational Disease    Part(s) of Body Injured or Affected  , ,     I certify that the above is true and correct to the best of my knowledge and that I have provided this information in order to obtain the benefits of [place]  I hereby authorize any physician, chiropractor, surgeon, practitioner, or other person, any hospital, including [facility] or [facility], any " medical service organization, any insurance company, or other institution or organization to release to each other, any medical or other information, including benefits paid or payable, pertinent to this injury or disease, except information relative to diagnosis, treatment and/or counseling for AIDS, psychological conditions, alcohol or controlled substances, for which I must give specific authorization.  A Photostat of this authorization shall be as valid as the original.   Date Place   Employee’s Signature   THIS REPORT MUST BE COMPLETED AND MAILED WITHIN 3 WORKING DAYS OF TREATMENT   Place  [facility], EMERGENCY DEPT  Name of Facility   [facility]   Date  8/5/2017 Diagnosis  No diagnosis found. Is there evidence the injured employee was under the influence of alcohol and/or another controlled substance at the time of accident?   Hour  6:40 PM Description of Injury or Disease       Treatment     Have you advised the patient to remain off work five days or more?             X-Ray Findings      If Yes   From Date    To Date      From information given by the employee, together with medical evidence, can you directly connect this injury or occupational disease as job incurred?    If No, is the employee capable of: Full Duty    Modified Duty      Is additional medical care by a physician indicated?    If Modified Duty, Specify any Limitations / Restrictions        Do you know of any previous injury or disease contributing to this condition or occupational disease?      Date  8/5/2017 Print Doctor’s Name  Sindy Cunningham I certify the employer’s copy of this form was mailed on:   Address  [address] [place] [address] [phone] Insurer’s Use Only   [facility]  [id number]    Provider’s Tax ID Number  [id number] Telephone  Dept: [phone]    Doctor’s Signature    Degree       Original - TREATING PHYSICIAN OR CHIROPRACTOR   Pg 2-Insurer/TPA   Pg  "3-Employer   Pg 4-Employee                                                                                                  Form C-4 (rev01/03)     BRIEF DESCRIPTION OF RIGHTS AND BENEFITS  (Pursuant to [place] 616[place])    Notice of Injury or Occupational Disease (Incident Report Form C-1): If an injury or occupational disease (OD) arises out of and in the course of employment, you must provide written notice to your employer as soon as practicable, but no later than 7 days after the accident or OD. Your employer shall maintain a sufficient supply of the required forms.    Claim for Compensation (Form C-4): If medical treatment is sought, the form C-4 is available at the place of initial treatment. A completed \"Claim for Compensation\" (Form C-4) must be filed within 90 days after an accident or OD. The treating physician or chiropractor must, within 3 working days after treatment, complete and mail to the employer, the employer's insurer and third-party , the Claim for Compensation.    Medical Treatment: If you require medical treatment for your on-the-job injury or OD, you may be required to select a physician or chiropractor from a list provided by your workers’ compensation insurer, if it has contracted with an Organization for Managed Care (MCO) or Preferred Provider Organization (PPO) or providers of health care. If your employer has not entered into a contract with an MCO or PPO, you may select a physician or chiropractor from the Panel of Physicians and Chiropractors. Any medical costs related to your industrial injury or OD will be paid by your insurer.    Temporary Total Disability (TTD): If your doctor has certified that you are unable to work for a period of at least 5 consecutive days, or 5 cumulative days in a 20-day period, or places restrictions on you that your employer does not accommodate, you may be entitled to TTD compensation.    Temporary Partial Disability (TPD): If the wage you " receive upon reemployment is less than the compensation for TTD to which you are entitled, the insurer may be required to pay you TPD compensation to make up the difference. TPD can only be paid for a maximum of 24 months.    Permanent Partial Disability (PPD): When your medical condition is stable and there is an indication of a PPD as a result of your injury or OD, within 30 days, your insurer must arrange for an evaluation by a rating physician or chiropractor to determine the degree of your PPD. The amount of your PPD award depends on the date of injury, the results of the PPD evaluation and your age and wage.    Permanent Total Disability (PTD): If you are medically certified by a treating physician or chiropractor as permanently and totally disabled and have been granted a PTD status by your insurer, you are entitled to receive monthly benefits not to exceed 66 2/3% of your average monthly wage. The amount of your PTD payments is subject to reduction if you previously received a PPD award.    Vocational Rehabilitation Services: You may be eligible for vocational rehabilitation services if you are unable to return to the job due to a permanent physical impairment or permanent restrictions as a result of your injury or occupational disease.    Transportation and Per Silver Reimbursement: You may be eligible for travel expenses and per silver associated with medical treatment.  Reopening: You may be able to reopen your claim if your condition worsens after claim closure.    Appeal Process: If you disagree with a written determination issued by the insurer or the insurer does not respond to your request, you may appeal to the Department of Administration, , by following the instructions contained in your determination letter. You must appeal the determination within 70 days from the date of the determination letter at [address], [address], [place], [place] [address], or [address],  [address], [place], [place] [address]. If you disagree with the  decision, you may appeal to the Department of Administration, . You must file your appeal within 30 days from the date of the  decision letter at [address], [address], [place], [place] [address], or [address], [place], [place] [address]. If you disagree with a decision of an , you may file a petition for judicial review with the District Court. You must do so within 30 days of the Appeal Officer’s decision. You may be represented by an  at your own expense or you may contact the [facility] for possible representation.    [place]  for Injured Workers (NAIW): If you disagree with a  decision, you may request that NAIW represent you without charge at an  Hearing. For information regarding denial of benefits, you may contact the [facility] at: [address], [address], [place] [address], [phone], or [address], [place] [address], [phone]    To File a Complaint with the Division: If you wish to file a complaint with the  of the Division of Industrial Relations (DIR), please contact the Workers’ Compensation Section, [address], [address], [place], [place] [address], telephone [phone], or [address], [address], [place] [address], telephone [phone].    For assistance with Workers’ Compensation Issues: you may contact the Office of the Governor Consumer Health Assistance, [address], [address], [place], [place] [address], Toll Free 1-317.430.9533, Web site: http://govcha.[facility].[email or web address]., E-mail [email or web address].                                                                                                                                                                                __________________________________________________________________                                    _________________            Employee Name / Signature                                                                                                                            Date                                       D-2 (rev. 10/07)
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
## 2017-08-06 NOTE — ED NOTES
Pt resting quietly in bed.  NAD noted.  Respirations even and unlabored. VS stable.  No questions or concerns to address at this time.    Pt medicated per MAR.
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
## 2017-08-06 NOTE — ED NOTES
Discharge instructions reviewed.  Pt verbalizes understanding and denies questions.  VS stable.  NAD noted.  Respirations even and unlabored.  Steady gait noted on ED exit.

## 2017-08-06 NOTE — ED NOTES
Report from MITZI Tipton.  Pt resting quietly in bed.  NAD noted.  Respirations even and unlabored. VS stable.  No questions or concerns to address at this time.

## 2017-08-06 NOTE — DISCHARGE INSTRUCTIONS
Back Exercises  Back exercises help treat and prevent back injuries. The goal of back exercises is to increase the strength of your abdominal and back muscles and the flexibility of your back. These exercises should be started when you no longer have back pain. Back exercises include:  · Pelvic Tilt. Lie on your back with your knees bent. Tilt your pelvis until the lower part of your back is against the floor. Hold this position 5 to 10 sec and repeat 5 to 10 times.  · Knee to Chest. Pull first 1 knee up against your chest and hold for 20 to 30 seconds, repeat this with the other knee, and then both knees. This may be done with the other leg straight or bent, whichever feels better.  · Sit-Ups or Curl-Ups. Bend your knees 90 degrees. Start with tilting your pelvis, and do a partial, slow sit-up, lifting your trunk only 30 to 45 degrees off the floor. Take at least 2 to 3 seconds for each sit-up. Do not do sit-ups with your knees out straight. If partial sit-ups are difficult, simply do the above but with only tightening your abdominal muscles and holding it as directed.  · Hip-Lift. Lie on your back with your knees flexed 90 degrees. Push down with your feet and shoulders as you raise your hips a couple inches off the floor; hold for 10 seconds, repeat 5 to 10 times.  · Back arches. Lie on your stomach, propping yourself up on bent elbows. Slowly press on your hands, causing an arch in your low back. Repeat 3 to 5 times. Any initial stiffness and discomfort should lessen with repetition over time.  · Shoulder-Lifts. Lie face down with arms beside your body. Keep hips and torso pressed to floor as you slowly lift your head and shoulders off the floor.  Do not overdo your exercises, especially in the beginning. Exercises may cause you some mild back discomfort which lasts for a few minutes; however, if the pain is more severe, or lasts for more than 15 minutes, do not continue exercises until you see your caregiver.  Improvement with exercise therapy for back problems is slow.   See your caregivers for assistance with developing a proper back exercise program.     This information is not intended to replace advice given to you by your health care provider. Make sure you discuss any questions you have with your health care provider.     Document Released: 01/25/2006 Document Revised: 03/11/2013 Document Reviewed: 02/11/2016  [employer] Interactive Patient Education ©2016 [employer]    Back Pain, Adult  Back pain is very common in adults. The cause of back pain is rarely dangerous and the pain often gets better over time. The cause of your back pain may not be known. Some common causes of back pain include:  · Strain of the muscles or ligaments supporting the spine.  · Wear and tear (degeneration) of the spinal disks.  · Arthritis.  · Direct injury to the back.  For many people, back pain may return. Since back pain is rarely dangerous, most people can learn to manage this condition on their own.  HOME CARE INSTRUCTIONS  Watch your back pain for any changes. The following actions may help to lessen any discomfort you are feeling:  · Remain active. It is stressful on your back to sit or  one place for long periods of time. Do not sit, drive, or  one place for more than 30 minutes at a time. Take short walks on even surfaces as soon as you are able. Try to increase the length of time you walk each day.  · Exercise regularly as directed by your health care provider. Exercise helps your back heal faster. It also helps avoid future injury by keeping your muscles strong and flexible.  · Do not stay in bed. Resting more than 1-2 days can delay your recovery.  · Pay attention to your body when you bend and lift. The most comfortable positions are those that put less stress on your recovering back. Always use proper lifting techniques, including:  ¨ Bending your knees.  ¨ Keeping the load close to your body.  ¨ Avoiding  twisting.  · Find a comfortable position to sleep. Use a firm mattress and lie on your side with your knees slightly bent. If you lie on your back, put a pillow under your knees.  · Avoid feeling anxious or stressed. Stress increases muscle tension and can worsen back pain. It is important to recognize when you are anxious or stressed and learn ways to manage it, such as with exercise.  · Take medicines only as directed by your health care provider. Over-the-counter medicines to reduce pain and inflammation are often the most helpful. Your health care provider may prescribe muscle relaxant drugs. These medicines help dull your pain so you can more quickly return to your normal activities and healthy exercise.  · Apply ice to the injured area:  ¨ Put ice in a plastic bag.  ¨ Place a towel between your skin and the bag.  ¨ Leave the ice on for 20 minutes, 2-3 times a day for the first 2-3 days. After that, ice and heat may be alternated to reduce pain and spasms.  · Maintain a healthy weight. Excess weight puts extra stress on your back and makes it difficult to maintain good posture.  SEEK MEDICAL CARE IF:  · You have pain that is not relieved with rest or medicine.  · You have increasing pain going down into the legs or buttocks.  · You have pain that does not improve in one week.  · You have night pain.  · You lose weight.  · You have a fever or chills.  SEEK IMMEDIATE MEDICAL CARE IF:   · You develop new bowel or bladder control problems.  · You have unusual weakness or numbness in your arms or legs.  · You develop nausea or vomiting.  · You develop abdominal pain.  · You feel faint.     This information is not intended to replace advice given to you by your health care provider. Make sure you discuss any questions you have with your health care provider.     Document Released: 12/18/2006 Document Revised: 01/08/2016 Document Reviewed: 04/21/2015  [employer] Interactive Patient Education ©2016 [employer]

## 2017-08-30 ENCOUNTER — HOSPITAL ENCOUNTER (EMERGENCY)
Facility: MEDICAL CENTER | Age: 47
End: 2017-08-30
Attending: EMERGENCY MEDICINE
Payer: MEDICAID

## 2017-08-30 ENCOUNTER — APPOINTMENT (OUTPATIENT)
Dept: RADIOLOGY | Facility: MEDICAL CENTER | Age: 47
End: 2017-08-30
Attending: EMERGENCY MEDICINE
Payer: MEDICAID

## 2017-08-30 VITALS
HEART RATE: 68 BPM | WEIGHT: 174.6 LBS | BODY MASS INDEX: 30.94 KG/M2 | RESPIRATION RATE: 16 BRPM | DIASTOLIC BLOOD PRESSURE: 74 MMHG | SYSTOLIC BLOOD PRESSURE: 131 MMHG | HEIGHT: 63 IN | OXYGEN SATURATION: 98 % | TEMPERATURE: 98.4 F

## 2017-08-30 DIAGNOSIS — H93.13 TINNITUS OF BOTH EARS: ICD-10-CM

## 2017-08-30 DIAGNOSIS — R42 VERTIGO: ICD-10-CM

## 2017-08-30 PROCEDURE — 70450 CT HEAD/BRAIN W/O DYE: CPT

## 2017-08-30 PROCEDURE — 99284 EMERGENCY DEPT VISIT MOD MDM: CPT

## 2017-08-30 PROCEDURE — 700102 HCHG RX REV CODE 250 W/ 637 OVERRIDE(OP): Performed by: EMERGENCY MEDICINE

## 2017-08-30 PROCEDURE — A9270 NON-COVERED ITEM OR SERVICE: HCPCS | Performed by: EMERGENCY MEDICINE

## 2017-08-30 PROCEDURE — 700111 HCHG RX REV CODE 636 W/ 250 OVERRIDE (IP): Performed by: EMERGENCY MEDICINE

## 2017-08-30 RX ORDER — ONDANSETRON 4 MG/1
4 TABLET, ORALLY DISINTEGRATING ORAL ONCE
Qty: 12 TAB | Refills: 0 | Status: SHIPPED | OUTPATIENT
Start: 2017-08-30 | End: 2017-08-30

## 2017-08-30 RX ORDER — MECLIZINE HYDROCHLORIDE 25 MG/1
25 TABLET ORAL 3 TIMES DAILY PRN
Qty: 30 TAB | Refills: 0 | Status: SHIPPED | OUTPATIENT
Start: 2017-08-30

## 2017-08-30 RX ORDER — CETIRIZINE HYDROCHLORIDE 10 MG/1
10 TABLET, CHEWABLE ORAL DAILY
Qty: 12 TAB | Refills: 0 | Status: SHIPPED | OUTPATIENT
Start: 2017-08-30 | End: 2022-12-20

## 2017-08-30 RX ORDER — ONDANSETRON 4 MG/1
4 TABLET, ORALLY DISINTEGRATING ORAL ONCE
Status: COMPLETED | OUTPATIENT
Start: 2017-08-30 | End: 2017-08-30

## 2017-08-30 RX ORDER — MECLIZINE HYDROCHLORIDE 25 MG/1
25 TABLET ORAL ONCE
Status: COMPLETED | OUTPATIENT
Start: 2017-08-30 | End: 2017-08-30

## 2017-08-30 RX ADMIN — MECLIZINE HYDROCHLORIDE 25 MG: 25 TABLET ORAL at 13:45

## 2017-08-30 RX ADMIN — ONDANSETRON 4 MG: 4 TABLET, ORALLY DISINTEGRATING ORAL at 13:45

## 2017-08-30 ASSESSMENT — ENCOUNTER SYMPTOMS
FALLS: 0
NAUSEA: 1
COUGH: 0
CHILLS: 0
FEVER: 0
HEADACHES: 0
VOMITING: 0
SORE THROAT: 0
DIZZINESS: 1

## 2017-08-30 ASSESSMENT — PAIN SCALES - GENERAL
PAINLEVEL_OUTOF10: 6
PAINLEVEL_OUTOF10: 3

## 2017-08-30 NOTE — ED NOTES
Patient reports readiness for discharge. Sister called for ride home. Discharge instructions, prescriptions x 3, and follow-up care reviewed with patient. All questions answered and patient verbalized understanding. Vss. Patient stable and ambulatory upon d/c from ED.

## 2017-08-30 NOTE — ED PROVIDER NOTES
ED Provider Note    Scribed for Daiana Car D.O. by Tevin Mckeon. 8/30/2017, 1:07 PM.    Primary care provider: Pcp Pt States None  Means of arrival: Walk In  History obtained from: Patient  History limited by: None    CHIEF COMPLAINT  Chief Complaint   Patient presents with   • Ringing in Ear     Left side x approximately 2 weeks       HPI  Laurie Enriquez is a 47 y.o. female who presents to the Emergency Department complaining of ringing in left ear for approximately 1 week or more.  Patient reports associated heaviness and pressure to forehead and left ear but denies pain. Physically, she denies having a headache. She denies any recent trauma or falls. Patient states symptoms come and go they are not constant.  Patient notes symptoms are exacerbated with movement, especially rotation of her head. She reports associated nausea but no vomiting or diarrhea. She reports dizziness when waking up in the morning but resolves shortly after waking. She does not have any dizziness at this time. Denies headache, cough, sore throat, fever, or ear pain. No new medications. She denies having any symptoms in her upper or lower extremities including numbness tingling or weakness. No change in her gait.       REVIEW OF SYSTEMS  Review of Systems   Constitutional: Negative for chills and fever.   HENT: Positive for tinnitus. Negative for ear pain and sore throat.    Respiratory: Negative for cough.    Gastrointestinal: Positive for nausea. Negative for vomiting.   Musculoskeletal: Negative for falls.   Neurological: Positive for dizziness. Negative for headaches.   All other systems reviewed and are negative.      PAST MEDICAL HISTORY   has a past medical history of Dental disorder (June 1, 2012); H. pylori infection; Infectious disease (works in school); and Pain (3/2017).    SURGICAL HISTORY   has a past surgical history that includes tubal coagulation laparoscopic bilateral (6/13/2012); hysteroscopy thermal ablation  "(6/13/2012); other abdominal surgery; and mass excision ortho (Right, 3/7/2017).    SOCIAL HISTORY  Social History   Substance Use Topics   • Smoking status: Never Smoker   • Smokeless tobacco: Never Used   • Alcohol use No      History   Drug Use No       FAMILY HISTORY  Family History   Problem Relation Age of Onset   • Hypertension Mother    • Heart Disease Father        CURRENT MEDICATIONS  No current facility-administered medications on file prior to encounter.      Current Outpatient Prescriptions on File Prior to Encounter   Medication Sig Dispense Refill   • naproxen (NAPROSYN) 500 MG Tab Take 1 Tab by mouth 2 times a day, with meals. 30 Tab 1   • oxycodone-acetaminophen (PERCOCET) 5-325 MG Tab Take 1-2 Tabs by mouth every four hours as needed. 15 Tab 0   • amoxicillin (AMOXIL) 500 MG Cap Take 500 mg by mouth 4 times a day. 14 day course for stomach infection     • omeprazole (PRILOSEC) 20 MG delayed-release capsule Take 20 mg by mouth 2 times a day.     • clarithromycin (BIAXIN) 500 MG Tab Take 500 mg by mouth 2 times a day. 14 day course for H Pylori infection     • ranitidine (ZANTAC) 150 MG Tab Take 150 mg by mouth 2 times a day.     • hydrocodone/acetaminophen (NORCO)  MG Tab Take 1-2 Tabs by mouth every four hours as needed for Moderate Pain (do not take with tylenol). 60 Tab 1   • diazepam (VALIUM) 5 MG Tab Take 1 Tab by mouth every 6 hours as needed (muscle spasm or insomnia). 30 Tab 1   • cephALEXin (KEFLEX) 500 MG Cap Take 1 Cap by mouth 4 times a day. 60 Cap 1   • gabapentin (NEURONTIN) 300 MG Cap Take 300 mg by mouth 3 times a day.     • acetaminophen 650 MG Tab Take 650 mg by mouth every 6 hours as needed for Mild Pain or Moderate Pain. 30 Tab 1       ALLERGIES  Allergies   Allergen Reactions   • Nkda [No Known Drug Allergy]        PHYSICAL EXAM  VITAL SIGNS: /77   Pulse 73   Temp 36.9 °C (98.4 °F) (Temporal)   Resp 16   Ht 1.6 m (5' 3\")   Wt 79.2 kg (174 lb 9.7 oz)   LMP " 09/15/2014 (Approximate)   SpO2 94%   BMI 30.93 kg/m²   Vitals reviewed.  Constitutional: Patient is oriented to person, place, and time. Appears well-developed and well-nourished. No distress.    Head: Normocephalic and atraumatic.   Ears: Normal external ears bilaterally. TM's normal bilaterally  Mouth/Throat: Oropharynx is clear and moist, no exudates.   Eyes: Conjunctivae are normal. Pupils are equal, round, and reactive to light.  EOMI. No nystagmus.  Neck: Normal range of motion. Neck supple.  Cardiovascular: Normal rate, regular rhythm and normal heart sounds. Normal peripheral pulses.  Pulmonary/Chest: Effort normal and breath sounds normal. No respiratory distress, no wheezes, rhonchi, or rales. No chest wall tenderness.  Abdominal: Soft. Bowel sounds are normal. There is no tenderness, rebound or guarding, or peritoneal signs No CVA tenderness.  Musculoskeletal: No edema and no tenderness.   Lymphadenopathy: No cervical adenopathy.   Neurological: No focal deficits.  cranial nerves II through XII intact. Normal speech. Normal cognition. Normal motor and sensory exam.  Skin: Skin is warm and dry. No erythema. No pallor.   Psychiatric: Patient has a normal mood and affect.     RADIOLOGY  CT-HEAD W/O   Final Result      No intracranial mass effect or acute hemorrhage identified.        The radiologist's interpretation of all radiological studies have been reviewed by me.    COURSE & MEDICAL DECISION MAKING  Pertinent Labs & Imaging studies reviewed. (See chart for details)    Obtained and reviewed past medical records from December 2015 which indicated patient admitted for headache and abnormal head CT showing mild frontal swelling. Reviewed follow up MRI which was normal.    1:07 PM - Patient seen and examined at bedside. Patient presents with 1-2 weeks of what essentially is tender tests. She also has some increased symptoms including pressure with rotation and movement of her head. She denies having a  headache. She does not have meningeal signs. It seems to be some symptoms are compatible with what could be benign positional vertigo. She did have some abnormal imaging findings in the past which he originally reported as being just 9 months ago however review of her records, it was a urine 9 months ago. She was concerned about these abnormal findings and if they had resolved. Patient will be treated with 25 mg Antivert tablet, 4 mg Zofran tablet. Ordered head CT to evaluate her symptoms. The differential diagnoses include but are not limited to: Vertigo vs sinusitis , intracranial abnormality     3:02 PM - Recheck patient. Patient states she feels improved and denies nausea. She notes motion sickness and symptoms related to rotation of her head are alleviated at this time. Patient informed I reviewed past medical records of CT scan and MRI which were normal. She was educated on discharge instructions and informed she will be prescribed Antivert and Zofran. Ordered to follow up with ENT. Patient verbalizes understanding.     The patient will return for new or worsening symptoms and is stable at the time of discharge.    The patient is referred to a primary physician for blood pressure management, diabetic screening, and for all other preventative health concerns.    DISPOSITION:  Patient will be discharged home in stable condition.    FOLLOW UP:  Bg Darnell M.D.  [address] [address]  [phone]    Schedule an appointment as soon as possible for a visit      [facility], Emergency Dept  [address] [facility] [address] [facility] [id number] [phone]    If symptoms worsen      OUTPATIENT MEDICATIONS:  New Prescriptions    CETIRIZINE (ZYRTEC) 10 MG CHEWABLE TABLET    Take 1 Tab by mouth every day.    MECLIZINE (ANTIVERT) 25 MG TAB    Take 1 Tab by mouth 3 times a day as needed for Nausea/Vomiting.    ONDANSETRON (ZOFRAN ODT) 4 MG TABLET DISPERSIBLE    Take 1 Tab by mouth Once for 1  dose.         FINAL IMPRESSION  1. Vertigo    2. Tinnitus of both ears          Tevin ALCARAZ (Scribe), am scribing for, and in the presence of, Daiana Car D.O..    Electronically signed by: Tevin Mckeon (Scribe), 8/30/2017    Daiana ALCARAZ D.O. personally performed the services described in this documentation, as scribed by Tevin Mckeon in my presence, and it is both accurate and complete.    The note accurately reflects work and decisions made by me.  Daiana Car  8/30/2017  4:22 PM

## 2017-08-30 NOTE — DISCHARGE INSTRUCTIONS
Vértigo postural chavez  (Benign Positional Vertigo)   Vértigo es la sensación de que el entorno se mueve estando quieto. Es la forma más frecuente de vértigo. Chavez significa que la causa del trastorno no es grave. Es más frecuente en adultos mayores.  CAUSAS   Es el resultado de un trastorno en el sistema laberíntico. Es emanuel donal en el oído medio que ayuda a controlar el equilibrio. La causa puede ser emanuel infección viral, emanuel lesión en la mello o un movimiento repetitivo. Sin embargo, a menudo no se halla causa.   SÍNTOMAS   Los síntomas de vértigo posicional chavez se producen al  la mello o los ojos en diferentes direcciones. Algunos de los síntomas pueden ser:   · Pérdida de equilibrio y caídas.  · Vómitos.  · Visión borrosa.  · Mareos.  · Náuseas.  · Movimientos oculares involuntarios (nistagmus).  DIAGNÓSTICO   El vértigo postural chavez se diagnostica mediante un examen físico. Si la causa específica de crystal vértigo posicional chavez es desconocido, crystal médico puede indicar diagnósticos por imágenes, anabella la resonancia magnética (RM) o la tomografía computada (TC).   TRATAMIENTO   El médico le podrá recomendar movimientos o procedimientos para corregir el vértigo posicional chavez. Para tratar los síntomas pueden indicarse medicamentos anabella meclizina, benzodiazepinas y medicamentos para las náuseas. En casos raros, si los síntomas son causados por ciertos trastornos que afectan el oído interno, es posible que necesite cirugía.   INSTRUCCIONES PARA EL CUIDADO EN EL HOGAR   · Siga las indicaciones del médico.  · Muévase lentamente. No kathy movimientos bruscos con la mello ni el cuerpo.  · Evite conducir vehículos.  · Evite operar maquinarias pesadas.  · Evite realizar tareas que serían peligrosas para usted u otras personas ashely un episodio de vértigo.  · Debe ingerir gran cantidad de líquido para mantener la orina de nayeli marquita o color amarillo pálido.  SOLICITE ATENCIÓN MÉDICA DE  INMEDIATO SI:   · Tiene dificultad para hablar, caminar, siente debilidad o tiene problemas para usar los brazos, las renée o las piernas.  · Tiene dificultad para respirar.  · Sufre un dolor de mello intenso.  · Las náuseas o los vómitos persisten o empeoran.  · Tiene cambios en la visión.  · Becca familiares o amigos notan cambios en crystal conducta.  · El dolor empeora.  · Tiene fiebre.  · Comienza a sentir rigidez en el mayra o sensibilidad a la jourdan.  ASEGÚRESE DE QUE:   · Comprende estas instrucciones.  · Controlará crystal enfermedad.  · Solicitará ayuda de inmediato si no mejora o si empeora.     Esta información no tiene anabella fin reemplazar el consejo del médico. Asegúrese de hacerle al médico cualquier pregunta que tenga.     Document Released: 04/05/2010 Document Revised: 03/11/2013  [employer] Interactive Patient Education ©2016 [employer]    Benign Positional Vertigo  Vertigo means you feel like you or your surroundings are moving when they are not. Benign positional vertigo is the most common form of vertigo. Benign means that the cause of your condition is not serious. Benign positional vertigo is more common in older adults.  CAUSES   Benign positional vertigo is the result of an upset in the labyrinth system. This is an area in the middle ear that helps control your balance. This may be caused by a viral infection, head injury, or repetitive motion. However, often no specific cause is found.  SYMPTOMS   Symptoms of benign positional vertigo occur when you move your head or eyes in different directions. Some of the symptoms may include:  · Loss of balance and falls.  · Vomiting.  · Blurred vision.  · Dizziness.  · Nausea.  · Involuntary eye movements (nystagmus).  DIAGNOSIS   Benign positional vertigo is usually diagnosed by physical exam. If the specific cause of your benign positional vertigo is unknown, your caregiver may perform imaging tests, such as magnetic resonance imaging (MRI) or computed  tomography (CT).  TREATMENT   Your caregiver may recommend movements or procedures to correct the benign positional vertigo. Medicines such as meclizine, benzodiazepines, and medicines for nausea may be used to treat your symptoms. In rare cases, if your symptoms are caused by certain conditions that affect the inner ear, you may need surgery.  HOME CARE INSTRUCTIONS   · Follow your caregiver's instructions.  · Move slowly. Do not make sudden body or head movements.  · Avoid driving.  · Avoid operating heavy machinery.  · Avoid performing any tasks that would be dangerous to you or others during a vertigo episode.  · Drink enough fluids to keep your urine clear or pale yellow.  SEEK IMMEDIATE MEDICAL CARE IF:   · You develop problems with walking, weakness, numbness, or using your arms, hands, or legs.  · You have difficulty speaking.  · You develop severe headaches.  · Your nausea or vomiting continues or gets worse.  · You develop visual changes.  · Your family or friends notice any behavioral changes.  · Your condition gets worse.  · You have a fever.  · You develop a stiff neck or sensitivity to light.  MAKE SURE YOU:   · Understand these instructions.  · Will watch your condition.  · Will get help right away if you are not doing well or get worse.     This information is not intended to replace advice given to you by your health care provider. Make sure you discuss any questions you have with your health care provider.     Document Released: 09/25/2007 Document Revised: 03/11/2013 Document Reviewed: 04/11/2016  [employer] Interactive Patient Education ©2016 [employer]        Tinnitus  (Tinnitus)  El término tinnitus hace referencia a la percepción de un alva que no se corresponde con ninguna hipolito real para rosa alva. A menudo se lo describe anabella zumbido de oídos. Sin embargo, las personas que sufren esta afección pueden percibir diferentes ruidos. Sugar persona puede percibir el alva en lizette o en ambos  oídos.   Los sonidos del tinnitus pueden ser suaves, alvaro o de intensidad intermedia. El tinnitus puede prolongarse pocos segundos o ser alton ashely varios días. Puede desaparecer sin tratamiento y regresar en distintos momentos. Cuando el tinnitus es permanente u ocurre con frecuencia, puede causar otros problemas, por ejemplo, dificultad para dormir y para concentrarse.  Brinda todas las personas tienen tinnitus en algún momento. El tinnitus a anselmo plazo (crónico) o que regresa con frecuencia es un problema que puede requerir atención médica.   CAUSAS   A menudo se desconoce la causa del tinnitus. En algunos casos, puede ser el resultado de otros problemas u otras afecciones, entre ellas:   · Exposición a ruidos alvaro de maquinarias, música u otras mosher.  · Pérdida auditiva.  · Infecciones de los oídos o de los senos paranasales.  · Acumulación de cerumen.  · Un objeto extraño en el oído.  · Uso de ciertos medicamentos.  · Consumo de alcohol y cafeína.  · Hipertensión arterial.  · Cardiopatías.  · Anemia.  · Alergias.  · Enfermedad de Meniere.  · Problemas de tiroides.  · Tumores.  · Dilatación de emanuel porción de un vaso sanguíneo debilitado (aneurisma).  SÍNTOMAS  El principal síntoma de tinnitus es la percepción de un alva que no se corresponde con ninguna hipolito, no proviene de ningún lugar. El alva puede percibirse anabella lo siguiente:   · Timbre.  · Crepitación.  · Zumbido.  · El soplido del aire, similar al alva que se percibe en emanuel caracola.  · Sibilancia.  · Silbido.  · Chisporroteo.  · Runrún.  · Emanuel corriente de agua.  · Emanuel nota musical sostenida.  DIAGNÓSTICO   El diagnóstico de tinnitus se basa en los síntomas. El médico le hará un examen físico. Se realizará un examen auditivo exhaustivo (audiometría) si el tinnitus:   · Afecta un solo oído (unilateral).  · Causa dificultades auditivas.  · Dura más de 6 meses.  Además, monty vez deba consultar a un médico especialista en trastornos  auditivos (audiólogo). Pueden pedirle que responda un cuestionario para determinar la gravedad del tinnitus que padece. Se pueden hacer estudios para ayudar a determinar la causa y descartar otras enfermedades. Estos pueden incluir los siguientes:  · Estudios de diagnóstico por imágenes de la mello y el cerebro, por ejemplo:  ¨ Tomografía computarizada.  ¨ Resonancia magnética.  · Un estudio de diagnóstico por imágenes de los vasos sanguíneos (angiografía).  TRATAMIENTO   A veces, el tratamiento de emanuel enfermedad preexistente hace que el tinnitus desaparezca. Si el tinnitus continúa, probablemente debe realizar alguno de los siguientes tratamientos, entre otros:  · Medicamentos, anabella determinados antidepresivos o pastillas para dormir.  · Generadores de alva para enmascarar el tinnitus. Estos incluyen los siguientes:  ¨ Aparatos de alva de adam que reproducen sonidos relajantes para ayudarlo a dormir.  ¨ Dispositivos inteligentes que se adaptan al oído y reproducen sonidos o música.  ¨ Un pequeño dispositivo que usa auriculares para emitir emanuel señal con música (estimulación acústica neuronal). Con el tiempo, esto puede modificar las redes del cerebro y reducir la sensibilidad al tinnitus. Izabela dispositivo se usa en los casos muy graves cuando ningún otro tratamiento resulta eficaz.  · Terapia y orientación para ayudarlo a controlar el estrés que significa vivir con tinnitus.  · El uso de audífonos o implantes cocleares, si el tinnitus guarda relación con la pérdida de la audición.  INSTRUCCIONES PARA EL CUIDADO EN EL HOGAR  · Cuando sea posible, no permanezca en lugares ruidosos y no se exponga a sonidos alvaro.  · Use dispositivos de protección de la audición, por ejemplo, tapones, cuando esté expuesto a ruidos alvaro.  · No consuma sustancias estimulantes, anabella nicotina, alcohol o cafeína.  · Ponga en práctica técnicas para reducir el estrés, anabella meditación, yoga o respiración profunda.  · Use un aparato  de alva de fondo, un humidificador u otros dispositivos para enmascarar el alva del tinnitus.  · Duerma con la mello levemente elevada. McRoberts puede reducir el impacto del tinnitus.  · Intente descansar lo suficiente todas las noches.  SOLICITE ATENCIÓN MÉDICA SI:  · Tiene tinnitus en un solo oído.  · El tinnitus se prolonga ashely 3 semanas o más tiempo y no se detiene.  · Las medidas de cuidados en el hogar no resultan eficaces.  · Tiene tinnitus después de sufrir emanuel lesión en la mello.  · Tiene tinnitus junto con alguno de estos síntomas:  ¨ Mareos.  ¨ Pérdida del equilibrio.  ¨ Náuseas y vómitos.     Esta información no tiene anabella fin reemplazar el consejo del médico. Asegúrese de hacerle al médico cualquier pregunta que tenga.     Document Released: 12/18/2006 Document Revised: 01/08/2016  [employer] Interactive Patient Education ©2016 [employer]      Tinnitus  Tinnitus refers to hearing a sound when there is no actual source for that sound. This is often described as ringing in the ears. However, people with this condition may hear a variety of noises. A person may hear the sound in one ear or in both ears.   The sounds of tinnitus can be soft, loud, or somewhere in between. Tinnitus can last for a few seconds or can be constant for days. It may go away without treatment and come back at various times. When tinnitus is constant or happens often, it can lead to other problems, such as trouble sleeping and trouble concentrating.  Almost everyone experiences tinnitus at some point. Tinnitus that is long-lasting (chronic) or comes back often is a problem that may require medical attention.   CAUSES   The cause of tinnitus is often not known. In some cases, it can result from other problems or conditions, including:   · Exposure to loud noises from machinery, music, or other sources.  · Hearing loss.  · Ear or sinus infections.  · Earwax buildup.  · A foreign object in the ear.  · Use of certain  medicines.  · Use of alcohol and caffeine.  · High blood pressure.  · Heart diseases.  · Anemia.  · Allergies.  · Meniere disease.  · Thyroid problems.  · Tumors.  · An enlarged part of a weakened blood vessel (aneurysm).  SYMPTOMS  The main symptom of tinnitus is hearing a sound when there is no source for that sound. It may sound like:   · Buzzing.  · Roaring.  · Ringing.  · Blowing air, similar to the sound heard when you listen to a seashell.  · Hissing.  · Whistling.  · Sizzling.  · Humming.  · Running water.  · A sustained musical note.  DIAGNOSIS   Tinnitus is diagnosed based on your symptoms. Your health care provider will do a physical exam. A comprehensive hearing exam (audiologic exam) will be done if your tinnitus:   · Affects only one ear (unilateral).  · Causes hearing difficulties.  · Lasts 6 months or longer.  You may also need to see a health care provider who specializes in hearing disorders (audiologist). You may be asked to complete a questionnaire to determine the severity of your tinnitus. Tests may be done to help determine the cause and to rule out other conditions. These can include:  · Imaging studies of your head and brain, such as:  ¨ A CT scan.  ¨ An MRI.  · An imaging study of your blood vessels (angiogram).  TREATMENT   Treating an underlying medical condition can sometimes make tinnitus go away. If your tinnitus continues, other treatments may include:  · Medicines, such as certain antidepressants or sleeping aids.  · Sound generators to mask the tinnitus. These include:  ¨ Tabletop sound machines that play relaxing sounds to help you fall asleep.  ¨ Wearable devices that fit in your ear and play sounds or music.  ¨ A small device that uses headphones to deliver a signal embedded in music (acoustic neural stimulation). In time, this may change the pathways of your brain and make you less sensitive to tinnitus. This device is used for very severe cases when no other treatment is  working.  · Therapy and counseling to help you manage the stress of living with tinnitus.  · Using hearing aids or cochlear implants, if your tinnitus is related to hearing loss.  HOME CARE INSTRUCTIONS  · When possible, avoid being in loud places and being exposed to loud sounds.  · Wear hearing protection, such as earplugs, when you are exposed to loud noises.  · Do not take stimulants, such as nicotine, alcohol, or caffeine.  · Practice techniques for reducing stress, such as meditation, yoga, or deep breathing.  · Use a white noise machine, a humidifier, or other devices to mask the sound of tinnitus.  · Sleep with your head slightly raised. This may reduce the impact of tinnitus.  · Try to get plenty of rest each night.  SEEK MEDICAL CARE IF:  · You have tinnitus in just one ear.  · Your tinnitus continues for 3 weeks or longer without stopping.  · Home care measures are not helping.  · You have tinnitus after a head injury.  · You have tinnitus along with any of the following:  ¨ Dizziness.  ¨ Loss of balance.  ¨ Nausea and vomiting.     This information is not intended to replace advice given to you by your health care provider. Make sure you discuss any questions you have with your health care provider.     Document Released: 12/18/2006 Document Revised: 01/08/2016 Document Reviewed: 05/20/2015  [employer] Interactive Patient Education ©2016 [employer]

## 2017-08-30 NOTE — ED NOTES
".  Chief Complaint   Patient presents with   • Ringing in Ear     Left side x approximately 2 weeks     ./77   Pulse 73   Temp 36.9 °C (98.4 °F) (Temporal)   Resp 16   Ht 1.6 m (5' 3\")   Wt 79.2 kg (174 lb 9.7 oz)   LMP 09/15/2014 (Approximate)   SpO2 94%   BMI 30.93 kg/m²     Ambulatory to triage with above complaint, educated on triage process, placed in lobby, told to inform staff of any changes in condition.    "

## 2022-03-01 ENCOUNTER — APPOINTMENT (OUTPATIENT)
Dept: RADIOLOGY | Facility: MEDICAL CENTER | Age: 52
End: 2022-03-01
Attending: EMERGENCY MEDICINE
Payer: MEDICAID

## 2022-03-01 ENCOUNTER — HOSPITAL ENCOUNTER (EMERGENCY)
Facility: MEDICAL CENTER | Age: 52
End: 2022-03-01
Attending: EMERGENCY MEDICINE
Payer: MEDICAID

## 2022-03-01 VITALS
HEART RATE: 85 BPM | RESPIRATION RATE: 15 BRPM | TEMPERATURE: 98.2 F | HEIGHT: 63 IN | SYSTOLIC BLOOD PRESSURE: 111 MMHG | WEIGHT: 168.21 LBS | DIASTOLIC BLOOD PRESSURE: 80 MMHG | BODY MASS INDEX: 29.8 KG/M2 | OXYGEN SATURATION: 93 %

## 2022-03-01 DIAGNOSIS — M54.16 LUMBAR RADICULOPATHY: ICD-10-CM

## 2022-03-01 LAB
ALBUMIN SERPL BCP-MCNC: 4.2 G/DL (ref 3.2–4.9)
ALBUMIN/GLOB SERPL: 1.3 G/DL
ALP SERPL-CCNC: 98 U/L (ref 30–99)
ALT SERPL-CCNC: 12 U/L (ref 2–50)
ANION GAP SERPL CALC-SCNC: 13 MMOL/L (ref 7–16)
AST SERPL-CCNC: 13 U/L (ref 12–45)
BASOPHILS # BLD AUTO: 0.4 % (ref 0–1.8)
BASOPHILS # BLD: 0.04 K/UL (ref 0–0.12)
BILIRUB SERPL-MCNC: 0.2 MG/DL (ref 0.1–1.5)
BUN SERPL-MCNC: 15 MG/DL (ref 8–22)
CALCIUM SERPL-MCNC: 9.5 MG/DL (ref 8.5–10.5)
CHLORIDE SERPL-SCNC: 106 MMOL/L (ref 96–112)
CO2 SERPL-SCNC: 22 MMOL/L (ref 20–33)
CREAT SERPL-MCNC: 0.67 MG/DL (ref 0.5–1.4)
EOSINOPHIL # BLD AUTO: 0.09 K/UL (ref 0–0.51)
EOSINOPHIL NFR BLD: 0.8 % (ref 0–6.9)
ERYTHROCYTE [DISTWIDTH] IN BLOOD BY AUTOMATED COUNT: 43.7 FL (ref 35.9–50)
GLOBULIN SER CALC-MCNC: 3.3 G/DL (ref 1.9–3.5)
GLUCOSE SERPL-MCNC: 108 MG/DL (ref 65–99)
HCT VFR BLD AUTO: 38.9 % (ref 37–47)
HGB BLD-MCNC: 12.9 G/DL (ref 12–16)
IMM GRANULOCYTES # BLD AUTO: 0.04 K/UL (ref 0–0.11)
IMM GRANULOCYTES NFR BLD AUTO: 0.4 % (ref 0–0.9)
LACTATE BLD-SCNC: 1.5 MMOL/L (ref 0.5–2)
LYMPHOCYTES # BLD AUTO: 2.42 K/UL (ref 1–4.8)
LYMPHOCYTES NFR BLD: 21.9 % (ref 22–41)
MCH RBC QN AUTO: 29.9 PG (ref 27–33)
MCHC RBC AUTO-ENTMCNC: 33.2 G/DL (ref 33.6–35)
MCV RBC AUTO: 90 FL (ref 81.4–97.8)
MONOCYTES # BLD AUTO: 0.56 K/UL (ref 0–0.85)
MONOCYTES NFR BLD AUTO: 5.1 % (ref 0–13.4)
NEUTROPHILS # BLD AUTO: 7.92 K/UL (ref 2–7.15)
NEUTROPHILS NFR BLD: 71.4 % (ref 44–72)
NRBC # BLD AUTO: 0 K/UL
NRBC BLD-RTO: 0 /100 WBC
PLATELET # BLD AUTO: 226 K/UL (ref 164–446)
PMV BLD AUTO: 9.2 FL (ref 9–12.9)
POTASSIUM SERPL-SCNC: 4 MMOL/L (ref 3.6–5.5)
PROT SERPL-MCNC: 7.5 G/DL (ref 6–8.2)
RBC # BLD AUTO: 4.32 M/UL (ref 4.2–5.4)
SODIUM SERPL-SCNC: 141 MMOL/L (ref 135–145)
WBC # BLD AUTO: 11.1 K/UL (ref 4.8–10.8)

## 2022-03-01 PROCEDURE — 36415 COLL VENOUS BLD VENIPUNCTURE: CPT

## 2022-03-01 PROCEDURE — A9270 NON-COVERED ITEM OR SERVICE: HCPCS | Performed by: EMERGENCY MEDICINE

## 2022-03-01 PROCEDURE — 83605 ASSAY OF LACTIC ACID: CPT

## 2022-03-01 PROCEDURE — 700111 HCHG RX REV CODE 636 W/ 250 OVERRIDE (IP): Performed by: EMERGENCY MEDICINE

## 2022-03-01 PROCEDURE — 80053 COMPREHEN METABOLIC PANEL: CPT

## 2022-03-01 PROCEDURE — 72100 X-RAY EXAM L-S SPINE 2/3 VWS: CPT

## 2022-03-01 PROCEDURE — 700102 HCHG RX REV CODE 250 W/ 637 OVERRIDE(OP): Performed by: EMERGENCY MEDICINE

## 2022-03-01 PROCEDURE — 85025 COMPLETE CBC W/AUTO DIFF WBC: CPT

## 2022-03-01 PROCEDURE — 96372 THER/PROPH/DIAG INJ SC/IM: CPT

## 2022-03-01 PROCEDURE — 99284 EMERGENCY DEPT VISIT MOD MDM: CPT

## 2022-03-01 PROCEDURE — 73552 X-RAY EXAM OF FEMUR 2/>: CPT | Mod: LT

## 2022-03-01 RX ORDER — METHYLPREDNISOLONE 4 MG/1
TABLET ORAL
Qty: 1 EACH | Refills: 0 | Status: ON HOLD | OUTPATIENT
Start: 2022-03-01 | End: 2022-12-21

## 2022-03-01 RX ORDER — ONDANSETRON 4 MG/1
4 TABLET, ORALLY DISINTEGRATING ORAL ONCE
Status: COMPLETED | OUTPATIENT
Start: 2022-03-01 | End: 2022-03-01

## 2022-03-01 RX ORDER — HYDROMORPHONE HYDROCHLORIDE 1 MG/ML
1 INJECTION, SOLUTION INTRAMUSCULAR; INTRAVENOUS; SUBCUTANEOUS ONCE
Status: COMPLETED | OUTPATIENT
Start: 2022-03-01 | End: 2022-03-01

## 2022-03-01 RX ORDER — HYDROCODONE BITARTRATE AND ACETAMINOPHEN 5; 325 MG/1; MG/1
1 TABLET ORAL EVERY 6 HOURS PRN
Qty: 10 TABLET | Refills: 0 | Status: SHIPPED | OUTPATIENT
Start: 2022-03-01 | End: 2022-03-04

## 2022-03-01 RX ORDER — HYDROCODONE BITARTRATE AND ACETAMINOPHEN 5; 325 MG/1; MG/1
1 TABLET ORAL ONCE
Status: COMPLETED | OUTPATIENT
Start: 2022-03-01 | End: 2022-03-01

## 2022-03-01 RX ORDER — KETOROLAC TROMETHAMINE 30 MG/ML
60 INJECTION, SOLUTION INTRAMUSCULAR; INTRAVENOUS ONCE
Status: COMPLETED | OUTPATIENT
Start: 2022-03-01 | End: 2022-03-01

## 2022-03-01 RX ADMIN — ONDANSETRON 4 MG: 4 TABLET, ORALLY DISINTEGRATING ORAL at 18:01

## 2022-03-01 RX ADMIN — HYDROCODONE BITARTRATE AND ACETAMINOPHEN 1 TABLET: 5; 325 TABLET ORAL at 22:33

## 2022-03-01 RX ADMIN — KETOROLAC TROMETHAMINE 60 MG: 30 INJECTION, SOLUTION INTRAMUSCULAR at 18:01

## 2022-03-01 RX ADMIN — HYDROMORPHONE HYDROCHLORIDE 1 MG: 1 INJECTION, SOLUTION INTRAMUSCULAR; INTRAVENOUS; SUBCUTANEOUS at 18:02

## 2022-03-01 ASSESSMENT — PAIN DESCRIPTION - PAIN TYPE: TYPE: ACUTE PAIN

## 2022-03-02 ENCOUNTER — TELEPHONE (OUTPATIENT)
Dept: SCHEDULING | Facility: IMAGING CENTER | Age: 52
End: 2022-03-02
Payer: MEDICAID

## 2022-03-02 NOTE — ED TRIAGE NOTES
"Chief Complaint   Patient presents with   • Back Pain     Left lower back. Pt states she feels a lump/swelling on her left buttock. Pt states it is painful and feels \"heavy.\" Pt denies numbness and tingling in left. Pain is noted throughout left leg. Pt denies any injury to back or leg.      Pt assisted to triage for above complaint.       /87   Pulse 92   Temp 37.6 °C (99.7 °F) (Tympanic)   Resp 16   Ht 1.6 m (5' 3\")   Wt 76.3 kg (168 lb 3.4 oz)   SpO2 96%   BMI 29.80 kg/m²       "

## 2022-03-02 NOTE — ED NOTES
Pt medicated per mar and PIV removed. Patient provided with discharge instructions. Patient verbalized understanding. Patient assisted out of ED with  and patient's personal crutches.

## 2022-03-02 NOTE — ED PROVIDER NOTES
"CHIEF COMPLAINT  Chief Complaint   Patient presents with   • Back Pain     Left lower back. Pt states she feels a lump/swelling on her left buttock. Pt states it is painful and feels \"heavy.\" Pt denies numbness and tingling in left. Pain is noted throughout left leg. Pt denies any injury to back or leg.        ASHLEE Lion is a 51 y.o. female who presents with lower back pain over the last month and over the last 3 days it has been radiating down her left buttock and leg.  She notes no numbness or weakness.  She does state that the proximal leg does feel \"heavy\".  No abdominal pain.  No vomiting.  No history of fever, cancer, IV drug use.  No trauma.  Nothing makes it better or worse outside of movement.  No burning with urination.  The patient is a diabetic and states her sugars have been in the 140 range.    REVIEW OF SYSTEMS  All other systems are negative.     PAST MEDICAL HISTORY  History reviewed. No pertinent past medical history.    FAMILY HISTORY  History reviewed. No pertinent family history.    SOCIAL HISTORY  Social History     Tobacco Use   • Smoking status: Never Smoker   • Smokeless tobacco: Never Used   Vaping Use   • Vaping Use: Never used   Substance and Sexual Activity   • Alcohol use: Not Currently   • Drug use: Not Currently       SURGICAL HISTORY  History reviewed. No pertinent surgical history.    CURRENT MEDICATIONS  Home Medications     Reviewed by Poppy Ramon R.N. (Registered Nurse) on 03/01/22 at 1646  Med List Status: <None>   Medication Last Dose Status        Patient Hemant Taking any Medications                       ALLERGIES  No Known Allergies    PHYSICAL EXAM  VITAL SIGNS: /87   Pulse 92   Temp 37.6 °C (99.7 °F) (Tympanic)   Resp 16   Ht 1.6 m (5' 3\")   Wt 76.3 kg (168 lb 3.4 oz)   SpO2 96%   BMI 29.80 kg/m²      Constitutional: Well developed, Well nourished, No acute distress, Non-toxic appearance.   HENT: Normocephalic, Atraumatic, TMs " normal, mucous membranes moist, no erythema, exudates, swelling, or masses, nares patent  Eyes: nonicteric  Neck: Supple, no meningismus  Lymphatic: No lymphadenopathy noted.   Cardiovascular: Regular rate and rhythm, no gallops rubs or murmurs  Lungs: Clear bilaterally   Abdomen: Soft and nontender throughout  Skin: Warm, Dry, no rash  Back: No focal tenderness over the spinous processes of the TLS spine  Genitalia: Deferred  Rectal: Deferred  Extremities: DTRs 2+ at the patella bilaterally, strength 5-5 both lower extremities, sensation intact, 2+ pulses DP and PT  Neurologic: Alert, appropriate, follows commands, moving all extremities, normal speech   Psychiatric: Affect normal    RADIOLOGY/PROCEDURES  DX-FEMUR-2+ LEFT   Final Result      Negative left thigh radiographs      DX-LUMBAR SPINE-2 OR 3 VIEWS   Final Result      No evidence of lumbar fracture or significant degenerative disk disease.        Results for orders placed or performed during the hospital encounter of 03/01/22   CBC WITH DIFFERENTIAL   Result Value Ref Range    WBC 11.1 (H) 4.8 - 10.8 K/uL    RBC 4.32 4.20 - 5.40 M/uL    Hemoglobin 12.9 12.0 - 16.0 g/dL    Hematocrit 38.9 37.0 - 47.0 %    MCV 90.0 81.4 - 97.8 fL    MCH 29.9 27.0 - 33.0 pg    MCHC 33.2 (L) 33.6 - 35.0 g/dL    RDW 43.7 35.9 - 50.0 fL    Platelet Count 226 164 - 446 K/uL    MPV 9.2 9.0 - 12.9 fL    Neutrophils-Polys 71.40 44.00 - 72.00 %    Lymphocytes 21.90 (L) 22.00 - 41.00 %    Monocytes 5.10 0.00 - 13.40 %    Eosinophils 0.80 0.00 - 6.90 %    Basophils 0.40 0.00 - 1.80 %    Immature Granulocytes 0.40 0.00 - 0.90 %    Nucleated RBC 0.00 /100 WBC    Neutrophils (Absolute) 7.92 (H) 2.00 - 7.15 K/uL    Lymphs (Absolute) 2.42 1.00 - 4.80 K/uL    Monos (Absolute) 0.56 0.00 - 0.85 K/uL    Eos (Absolute) 0.09 0.00 - 0.51 K/uL    Baso (Absolute) 0.04 0.00 - 0.12 K/uL    Immature Granulocytes (abs) 0.04 0.00 - 0.11 K/uL    NRBC (Absolute) 0.00 K/uL   COMP METABOLIC PANEL   Result  "Value Ref Range    Sodium 141 135 - 145 mmol/L    Potassium 4.0 3.6 - 5.5 mmol/L    Chloride 106 96 - 112 mmol/L    Co2 22 20 - 33 mmol/L    Anion Gap 13.0 7.0 - 16.0    Glucose 108 (H) 65 - 99 mg/dL    Bun 15 8 - 22 mg/dL    Creatinine 0.67 0.50 - 1.40 mg/dL    Calcium 9.5 8.5 - 10.5 mg/dL    AST(SGOT) 13 12 - 45 U/L    ALT(SGPT) 12 2 - 50 U/L    Alkaline Phosphatase 98 30 - 99 U/L    Total Bilirubin 0.2 0.1 - 1.5 mg/dL    Albumin 4.2 3.2 - 4.9 g/dL    Total Protein 7.5 6.0 - 8.2 g/dL    Globulin 3.3 1.9 - 3.5 g/dL    A-G Ratio 1.3 g/dL   LACTIC ACID   Result Value Ref Range    Lactic Acid 1.5 0.5 - 2.0 mmol/L   ESTIMATED GFR   Result Value Ref Range    GFR If African American >60 >60 mL/min/1.73 m 2    GFR If Non African American >60 >60 mL/min/1.73 m 2         COURSE & MEDICAL DECISION MAKING  Pertinent Labs & Imaging studies reviewed. (See chart for details)  This is a 51-year-old female with a reported history of diabetes who appears to have sciatica.  She was complaining of pain in her low back for about a month but then over the last few days had pain primarily in the lateral aspect of her left leg and buttock area.  She described a \"lump\" but I do not see anything consistent with that on exam here today.  There is no redness.  Patient is neurovascular intact in both lower extremities with intact pulses, motor function and sensory function.  The patient did have some tenderness in the lateral thigh to palpation-I did do an x-ray of that area as well as labs over the remote concern for possible necrotizing fasciitis.  I doubt this is the patient's white count is 11.1 and lactate is 1.5.  She is afebrile here.  Electrolytes are reassuring.  X-ray of the lumbar spine demonstrates no evidence of abnormalities.  The patient has no high risk red flags to include fever, cancer, weight loss, IV drugs.  Patient will be treated for lumbar radiculopathy.  At this time I doubt epidural abscess, epidural hematoma, cauda " equina or cord compression.    FINAL IMPRESSION  1.  Lumbar radiculopathy  2.   3.         Electronically signed by: Tang José M.D., 3/1/2022 5:55 PM

## 2022-03-02 NOTE — ED NOTES
Pain rated 7/10. Pt declines need for additional pain medication at this time. Call bell within reach.

## 2022-03-22 ENCOUNTER — OFFICE VISIT (OUTPATIENT)
Dept: MEDICAL GROUP | Facility: PHYSICIAN GROUP | Age: 52
End: 2022-03-22
Attending: EMERGENCY MEDICINE

## 2022-03-22 VITALS
TEMPERATURE: 98.3 F | SYSTOLIC BLOOD PRESSURE: 136 MMHG | HEIGHT: 63 IN | OXYGEN SATURATION: 97 % | RESPIRATION RATE: 16 BRPM | HEART RATE: 92 BPM | WEIGHT: 171 LBS | DIASTOLIC BLOOD PRESSURE: 90 MMHG | BODY MASS INDEX: 30.3 KG/M2

## 2022-03-22 DIAGNOSIS — M54.32 SCIATICA OF LEFT SIDE: ICD-10-CM

## 2022-03-22 DIAGNOSIS — M54.2 NECK PAIN: ICD-10-CM

## 2022-03-22 PROBLEM — M94.20 CHONDROMALACIA: Status: ACTIVE | Noted: 2017-01-12

## 2022-03-22 PROBLEM — A04.8 HELICOBACTER PYLORI GASTROINTESTINAL TRACT INFECTION: Status: ACTIVE | Noted: 2017-02-02

## 2022-03-22 PROBLEM — R73.03 PREDIABETES: Status: ACTIVE | Noted: 2022-03-22

## 2022-03-22 PROBLEM — M25.50 JOINT PAIN: Status: ACTIVE | Noted: 2022-03-22

## 2022-03-22 PROBLEM — M54.30 SCIATICA: Status: ACTIVE | Noted: 2022-03-22

## 2022-03-22 PROBLEM — D16.9 BENIGN NEOPLASM OF BONE: Status: ACTIVE | Noted: 2017-01-12

## 2022-03-22 PROBLEM — G89.29 CHRONIC PAIN: Status: ACTIVE | Noted: 2022-03-22

## 2022-03-22 PROBLEM — R42 DIZZINESS AND GIDDINESS: Status: ACTIVE | Noted: 2022-03-22

## 2022-03-22 PROBLEM — Z90.710 STATUS POST HYSTERECTOMY: Status: ACTIVE | Noted: 2018-04-03

## 2022-03-22 PROCEDURE — 99203 OFFICE O/P NEW LOW 30 MIN: CPT | Performed by: PHYSICIAN ASSISTANT

## 2022-03-22 RX ORDER — MECLIZINE HCL 25MG 25 MG/1
TABLET, CHEWABLE ORAL
COMMUNITY
End: 2022-12-20

## 2022-03-22 RX ORDER — CYCLOBENZAPRINE HCL 5 MG
TABLET ORAL DAILY
COMMUNITY

## 2022-03-22 RX ORDER — HYDROCODONE BITARTRATE AND ACETAMINOPHEN 5; 325 MG/1; MG/1
TABLET ORAL
COMMUNITY
End: 2022-12-20

## 2022-03-22 RX ORDER — IBUPROFEN 800 MG/1
TABLET ORAL
COMMUNITY

## 2022-03-22 RX ORDER — SIMVASTATIN 20 MG
25 TABLET ORAL NIGHTLY
COMMUNITY

## 2022-03-22 ASSESSMENT — PATIENT HEALTH QUESTIONNAIRE - PHQ9: CLINICAL INTERPRETATION OF PHQ2 SCORE: 0

## 2022-03-22 ASSESSMENT — FIBROSIS 4 INDEX: FIB4 SCORE: 0.85

## 2022-03-22 NOTE — PROGRESS NOTES
Subjective:     CC:  Diagnoses of Sciatica of left side and Neck pain were pertinent to this visit.    HISTORY OF THE PRESENT ILLNESS: Patient is a 51 y.o. female. This pleasant patient is here today to establish care and discuss back and neck pain. Her PCP is the [facility] clinic.    Patient was evaluated in the emergency room and a referral was put in for her to be seen here.  She is still doing all of her chronic care management with her PCP at the [facility].     Sciatica  Patient was recently evaluated at the emergency room for low back pain that was associated with sciatica.  This has been ongoing for over 2 years now.  She now needs to ambulate with a cane secondary to her pain.  She had an x-ray completed at that the ER that showed no evidence of lumbar fracture or significant degenerative disk disease. Did PT for over a year for this two years and did not help.  Patient denies progressive weakness, saddle anesthesia, urinary retention, osteoporosis, trauma, infection, immunocompromised state, IV drug use, night pain, oral steroid use, fever, h/o malignancy,  unintentional weight loss       Allergies: Patient has no known allergies.    Current Outpatient Medications Ordered in Epic   Medication Sig Dispense Refill   • metformin (GLUCOPHAGE) 1000 MG tablet 1 tablet with a meal     • cyclobenzaprine (FLEXERIL) 5 mg tablet 1-2  tablet     • diclofenac sodium (VOLTAREN) 1 % Gel as directed     • HYDROcodone-acetaminophen (NORCO) 5-325 MG Tab per tablet 1 tablet as needed     • ibuprofen (MOTRIN) 800 MG Tab 1 tablet with food or milk as needed     • Meclizine HCl 25 MG Chew Tab 1 tablet as needed for dizziness     • simvastatin (ZOCOR) 20 MG Tab 1 Tablet orally once per Day for 30 Days at bedtime for cholesterol     • methylPREDNISolone (MEDROL DOSEPAK) 4 MG Tablet Therapy Pack Use as directed 1 Each 0     No current [facility]-ordered facility-administered medications on file.       History reviewed. No pertinent past  "medical history.    History reviewed. No pertinent surgical history.    Social History     Tobacco Use   • Smoking status: Never Smoker   • Smokeless tobacco: Never Used   Vaping Use   • Vaping Use: Never used   Substance Use Topics   • Alcohol use: Not Currently   • Drug use: Not Currently       Social History     Social History Narrative   • Not on file       History reviewed. No pertinent family history.    Health Maintenance: Completed    ROS:   Gen: no fevers/chills, no changes in weight  Eyes: no changes in vision  Pulm: no sob, no cough  CV: no chest pain, no palpitations  GI: no nausea/vomiting, no diarrhea  : no dysuria  MSk: positive for neck and back pain   Skin: no rash  Neuro: positive for numbness/tingling      Objective:     Exam: /90   Pulse 92   Temp 36.8 °C (98.3 °F) (Temporal)   Resp 16   Ht 1.6 m (5' 3\")   Wt 77.6 kg (171 lb)   SpO2 97%  Body mass index is 30.29 kg/m².    General: Normal appearing. No distress.  HEENT: Normocephalic. Eyes conjunctiva clear lids without ptosis, pupils equal and reactive to light accommodation, ears normal shape and contour  Neck: Supple. Thyroid is not enlarged.  Pulmonary: Clear to ausculation.  Normal effort. No rales, ronchi, or wheezing.  Cardiovascular: Regular rate and rhythm without murmur.   Abdomen: Soft, nontender, nondistended. Normal bowel sounds. Liver and spleen are not palpable  Neurologic: Grossly nonfocal. Ambulating with a walker  Lymph: No cervical or supraclavicular lymph nodes are palpable  Skin: Warm and dry.  No obvious lesions.  Musculoskeletal: Normal gait. No extremity cyanosis, clubbing, or edema. Back: Decreased ROM in all directions, 5/5 LE strength, sensation intact bilaterally in LE, no TTP over spinous processes, paraspinals TTP on the left, SI joint nontender Neck: No visible deformity. No spinal tenderness to palpation. Mildly decrease ROM with extension of the neck secondary to pain. Spurling's test negative. "   Psych: Normal mood and affect. Alert and oriented x3. Judgment and insight is normal.    Imaging:   Lumbar x-ray from 3/1/2020  IMPRESSION:     No evidence of lumbar fracture or significant degenerative disk disease.    Assessment & Plan:   51 y.o. female with the following -    1. Sciatica of left side  Chronic.  Patient recently had an x-ray completed that showed normal lumbar spine, however she is having significant sciatica pain on the left.  She has done physical therapy for this pain for over a year in the past with no success so I put an order for an MRI for further evaluation.  Depending on the results, talked about a referral to orthopedics, spine surgery, or physiatry.  Patient is very interested in seeing a specialist as this pain has been significant for her and she is now ambulating with a cane.  She does not have any red flag symptoms so do not think emergent imaging is necessary at this time.  We will follow up after imaging.  - MR-LUMBAR SPINE-W/O; Future    2. Neck pain  Chronic.  Patient reports significant neck pain especially with extension of her neck.  She does have some decreased range of motion with extension secondary to pain but otherwise her neck exam is benign.  Again, patient is very interested in seeing a specialist and obtaining further imaging of her neck as this has been bothering her for many years.  No radiculopathy symptoms in her upper extremities.  We will follow up once I have the results of her MRI.   - MR-CERVICAL SPINE-W/O; Future    I spent a total of 40 minutes with record review, exam, and communication with the patient, communication with other providers, and documentation of this encounter.     Return for After imaging.  Patient is adamant that she does not want me to take over her chronic care management and she is still established with a new PCP at the [facility].  She has follow-up with them already scheduled.    Please note that this dictation was created using  voice recognition software. I have made every reasonable attempt to correct obvious errors, but I expect that there are errors of grammar and possibly content that I did not discover before finalizing the note.    Electronically signed by Esme Tapia PA-C on March 22, 2022

## 2022-03-22 NOTE — ASSESSMENT & PLAN NOTE
Patient was recently evaluated at the emergency room for low back pain that was associated with sciatica.  This has been ongoing for over 2 years now.  She now needs to ambulate with a cane secondary to her pain.  She had an x-ray completed at that the ER that showed no evidence of lumbar fracture or significant degenerative disk disease. Did PT for over a year for this two years and did not help.  Patient denies progressive weakness, saddle anesthesia, urinary retention, osteoporosis, trauma, infection, immunocompromised state, IV drug use, night pain, oral steroid use, fever, h/o malignancy,  unintentional weight loss

## 2022-07-12 ENCOUNTER — HOSPITAL ENCOUNTER (OUTPATIENT)
Dept: RADIOLOGY | Facility: MEDICAL CENTER | Age: 52
End: 2022-07-12
Attending: INTERNAL MEDICINE
Payer: MEDICAID

## 2022-07-12 DIAGNOSIS — M05.79 SEROPOSITIVE RHEUMATOID ARTHRITIS OF MULTIPLE SITES (HCC): ICD-10-CM

## 2022-07-12 PROCEDURE — 73630 X-RAY EXAM OF FOOT: CPT | Mod: RT

## 2022-07-12 PROCEDURE — 73630 X-RAY EXAM OF FOOT: CPT | Mod: LT

## 2022-07-12 PROCEDURE — 73130 X-RAY EXAM OF HAND: CPT | Mod: RT

## 2022-07-12 PROCEDURE — 73130 X-RAY EXAM OF HAND: CPT | Mod: LT

## 2022-08-09 NOTE — OP THERAPY EVALUATION
"  Outpatient Physical Therapy  VESTIBULAR EVALUATION    [facility] [address]  [address]  [place] [address]  Phone:  [phone]  Fax:  [phone]    Date of Evaluation: 08/10/2022    Patient: Laurie Lion  YOB: 1970  MRN: [id number]     Referring Provider: Aissatou Braden M.D.  [address] [place] [address]   Referring Diagnosis: Sensorineural hearing loss, bilateral [H90.3];Dizziness and giddiness [R42];Tinnitus, left ear [H93.12];Myalgia of auxiliary muscles, head and neck [M79.12]     Time Calculation    Start time: 1056  Stop time: 1130 Time Calculation (min): 34 minutes           Chief Complaint: Vertigo and Headache    Visit Diagnoses     ICD-10-CM   1. Dizziness and giddiness  R42   2. Sensorineural hearing loss, bilateral  H90.3   3. Tinnitus, left ear  H93.12   4. Myalgia of auxiliary muscles, head and neck  M79.12   5. Chronic intractable headache, unspecified headache type  R51.9    G89.29         History of Present Illness:     Mechanism of injury:  Pt reports onset of dizziness/LOB the last 3 years ago. Pt also reports feeling foggy/confused often overwhelmed when symptoms. When symptoms occur pr reports changes in her vision including blurry/difficulty.    When asked more specifically for symptoms pt reports feeling \"weird\". Light headed, woozy.  Most notably with immediate standing or transition from supine to sit stating it sometimes takes a while and is better when moving slow.     Pt reports some changes that appear more orthostatic in nature-also related to head aches per patient.    Pt reports she is unable to work because of LOB and falling (2019). Pt reports no injuries when falling at work. Most recent fall 3 months ago in the bathroom. Pt describes taking off her shirt and fell. Pt able to independently get off the floor.    Patient endorses history of migraines/headaches that she takes ibuprofen-sometimes " does not help. Pt reports ice can help her headaches. Sometimes headache is worse in supine but other times dec pain.    Pt endorses chronic neck pain in and upper trap pain relatively constant.    Reports DM2 and HTN diagnoses. States previously on HTN meds but she discontinued herself months ago. Later states she told her dr.     Pt lives with spouse and two teenage children.     Pt wears bi focals x 4 years.    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 pylori infection     was on antibiotic    Infectious disease works in school    Pain 3/2017    right knee, mid back, all joints     Past Surgical History:   Procedure Laterality Date    MASS EXCISION ORTHO Right 3/7/2017    Procedure: MASS EXCISION ORTHO - POSTERIOR KNEE TUMOR REMOVAL;  Surgeon: Kaden Cheatham M.D.;  Location: SURGERY [facility];  Service:     TUBAL COAGULATION LAPAROSCOPIC BILATERAL  2012    Performed by LEONARDO TRAORE at SURGERY SAME DAY [facility]    HYSTEROSCOPY THERMAL ABLATION  2012    Performed by LEONARDO TRAORE at SURGERY SAME DAY [facility]    OTHER ABDOMINAL SURGERY      appy     Social History     Tobacco Use    Smoking status: Never    Smokeless tobacco: Never   Substance Use Topics    Alcohol use: Not Currently     Family and Occupational History     Socioeconomic History    Marital status:      Spouse name: Not on file    Number of children: Not on file    Years of education: Not on file    Highest education level: Not on file   Occupational History    Not on file       Objective:    Observation:     Comments: Palpation of left upper trap and SCM  reproduced headache symptoms  Vital Signs:     Comments: Sitting Lt UE: 117/87  Standin/86 however reported no symptoms  Gait:     Comments: WNL  Vestibulospinal Exam:     MCTSIB:         Comments: Due to time constraints unable to assess    Oculomotor Exam:         Details: No spontaneous nystagmus central gaze          Details: No spontaneous nystagmus eccentric gaze    No saccadic eye movements    Smooth pursuit present    Convergence:        Normal convergence  Active Range of Motion:     Within functional limits  Limb Ataxia Exam:     Finger-to-Nose:         Intention tremor: none        Overshooting: none    Dysdiadochokinesia: pronation-supination and toe tapping  Strength Exam:     Upper extremities within functional limits    Lower extremities within functional limits  Sensation Tests:     Left Light Touch Sensation:         All left lumbar dermatomes intact      Right Light Touch Sensation:         All right lumbar dermatomes intact    Vestibulo-Ocular Exam:     Abnormal head thrust test        Details: corrective saccade on head moving right      Therapeutic Exercises (CPT 90792):       Therapeutic Exercise Summary: HEP: documenting blood sugars and blood pressures as well as description of how patient is feeling.     Time-based treatments/modalities:             Assessment:     Functional impairments:  Decreased utilization of VIS/vest/somato, decreased gaze stabilization, pain and gait abnormality/instability    Assessment details:  Laurie presents to PT with chief complaint of instability, headaches and dizziness, later clarified to be light headedness, impacting patient function. Pt did arrive late to evaluation therefore further assessment will take place next visit including BPPV, balance, and further headache assessment. However, did have some dec gaze stabilization as noted with head impulse test as well as what is likely cervicogenic headaches reproduced by palpation of SCM and upper trap.  Significant time discussing and educating pt on changes in BP or blood glucose can contribute to symptoms and encouraged close and documentation of her readings to determine a possible correlation. She describes orthostatic occurences subjectively however today was normal and no symptoms with sit to stands. Pt will benefit from PT 1-2x per week to improve symptoms, dec fall risk, and improve QoL.     Barriers to therapy:  Comorbidities    Prognosis: good    Goals:     Short term goals:  1. Pt will be complaint with home log documenting BP and blood glucose with symptoms.  2. Pt will be compliant with HEP for improving strength, balance, and decreasing symptoms.   3. Pt will complete most appropriate balance assessment.     Short term goal time span:  2-4 weeks    Long term goals:  1. Pt will report dec in head ache frequency to 2x or less per week.  2. Pt will demonstrate subjective improvement with DHI 50% or better.  3. Pt will score low fall risk on appropriate outcome measure.  4. Pt will have negative head impulse test to demonstrate improved gaze stabilization.    Long term goal time span:  6-8 weeks  Plan:     Therapy options:  Physical therapy treatment to continue    Planned therapy interventions:  Neuromuscular Re-education (CPT 75103), Therapeutic Exercise (CPT 47832), Therapeutic Activities (CPT 70252), Gait Training (CPT 89217), Functional Training, Self Care (CPT 79573) and Canalith Repositioning (CPT 76730)    Frequency:  2x week    Duration in weeks:  8    Discussed with:  Patient     Plan details:  1-2x 8 weeks     Functional Assessment Used  Dizziness Handicap Inventory - Physical Items Score: 28  Dizziness Handicap Inventory - Emotional Items Score: 28  Dizziness Handicap Inventory - Functional Items Score: 24  Dizziness Handicap Inventory - Total Score: 80       Referring provider co-signature:  I have reviewed this plan of care and my co-signature certifies the need for  services.    Certification Period: 08/10/2022 to  10/05/22    Physician Signature: ________________________________ Date: ______________

## 2022-08-10 ENCOUNTER — PHYSICAL THERAPY (OUTPATIENT)
Dept: PHYSICAL THERAPY | Facility: REHABILITATION | Age: 52
End: 2022-08-10
Attending: OTOLARYNGOLOGY
Payer: MEDICAID

## 2022-08-10 DIAGNOSIS — R42 DIZZINESS AND GIDDINESS: ICD-10-CM

## 2022-08-10 DIAGNOSIS — M79.12 MYALGIA OF AUXILIARY MUSCLES, HEAD AND NECK: ICD-10-CM

## 2022-08-10 DIAGNOSIS — G89.29 CHRONIC INTRACTABLE HEADACHE, UNSPECIFIED HEADACHE TYPE: ICD-10-CM

## 2022-08-10 DIAGNOSIS — H90.3 SENSORINEURAL HEARING LOSS, BILATERAL: ICD-10-CM

## 2022-08-10 DIAGNOSIS — R51.9 CHRONIC INTRACTABLE HEADACHE, UNSPECIFIED HEADACHE TYPE: ICD-10-CM

## 2022-08-10 DIAGNOSIS — H93.12 TINNITUS, LEFT EAR: ICD-10-CM

## 2022-08-10 PROCEDURE — 97162 PT EVAL MOD COMPLEX 30 MIN: CPT

## 2022-08-10 ASSESSMENT — ENCOUNTER SYMPTOMS
PAIN SCALE AT LOWEST: 0
PAIN SCALE: 7
PAIN SCALE AT HIGHEST: 8
QUALITY: STABBING
POSTURAL HEADACHE: 1

## 2022-08-17 ENCOUNTER — APPOINTMENT (OUTPATIENT)
Dept: PHYSICAL THERAPY | Facility: REHABILITATION | Age: 52
End: 2022-08-17
Attending: OTOLARYNGOLOGY
Payer: MEDICAID

## 2022-08-24 ENCOUNTER — APPOINTMENT (OUTPATIENT)
Dept: PHYSICAL THERAPY | Facility: REHABILITATION | Age: 52
End: 2022-08-24
Attending: OTOLARYNGOLOGY
Payer: MEDICAID

## 2022-09-12 ENCOUNTER — PHYSICAL THERAPY (OUTPATIENT)
Dept: PHYSICAL THERAPY | Facility: REHABILITATION | Age: 52
End: 2022-09-12
Attending: OTOLARYNGOLOGY
Payer: MEDICAID

## 2022-09-12 DIAGNOSIS — R51.9 CHRONIC INTRACTABLE HEADACHE, UNSPECIFIED HEADACHE TYPE: ICD-10-CM

## 2022-09-12 DIAGNOSIS — G89.29 CHRONIC INTRACTABLE HEADACHE, UNSPECIFIED HEADACHE TYPE: ICD-10-CM

## 2022-09-12 DIAGNOSIS — R42 DIZZINESS AND GIDDINESS: ICD-10-CM

## 2022-09-12 DIAGNOSIS — M79.12 MYALGIA OF AUXILIARY MUSCLES, HEAD AND NECK: ICD-10-CM

## 2022-09-12 PROCEDURE — 97110 THERAPEUTIC EXERCISES: CPT

## 2022-09-12 NOTE — OP THERAPY DAILY TREATMENT
Outpatient Physical Therapy  DAILY TREATMENT     [facility] Physical [address].  [address]  [place] [address]  Phone:  [phone]  Fax:  [phone]    Date: 09/12/2022    Patient: Laurie Lion  YOB: 1970  MRN: [id number]     Time Calculation    Start time: 1500  Stop time: 1540 Time Calculation (min): 40 minutes         Chief Complaint: Headache, Neck Problem, and Vertigo    Visit #: 2    SUBJECTIVE:  Pt states dizy all the time. When asked to describe dizzy reports light headed. Say her BP and sugars have been normal, stays hydrated, eats regularly. States significant headache/migraine today. Also states significant UE pin which impacts her ability to get dressed.    OBJECTIVE:  Current objective measures:   head thrust test        Details: corrective saccade on head moving right    BPPV:negative bilat oskar duran pike, log roll, head hang  Ultt median and ulnar positive bilat  Compression/distraction positive: some dec in symptoms with distraction  Cervical ROM pain at end range.   DGI next visit           Therapeutic Exercises (CPT 84173):     1. supine chin tuck, x 10    2. seated upper trap stretch, 2 x 1 min B    3. median nerve flossing, 2 x 5    4. ulnar nerve flossing, 2 x 5    5. cervical assessment    6. stm suboccipitals, SCM, posteior cervical musculauture, upper trap    7. BPPV assessment    20. 8/10-10/5      Therapeutic Exercise Summary: Access Code: [email or web address]  URL: [email or web address]  Date: 09/12/2022  Prepared by:    Exercises  Supine Chin Tuck - 1 x daily - 7 x weekly - 3 sets - 10 reps  Seated Gentle Upper Trapezius Stretch - 1 x daily - 7 x weekly - 3 reps - 1 min hold  Median Nerve Flossing - Tray - 1 x daily - 7 x weekly - 3 sets - 5 reps  Ulnar Nerve Flossing - 1 x daily - 7 x weekly - 3 sets - 5 reps      Time-based treatments/modalities:    Physical Therapy Timed Treatment Charges  Therapeutic exercise minutes (CPT 38171): 40  minutes      ASSESSMENT:   Response to treatment: Pt has not been seen x 1 month due to scheduling difficulties therefore any progress towards goals is min to none as no intervention has occurred. Due to significant headache did not perform DGI, will complete next visit or appropriate balance assessment. Pt headache likely cervicogenic in nature and will benefit from further intervention. Will continue to assess complaints of dizziness/light headed which may be cervicogenic as well and/or more so related to underlying causes such as blood glucose/HTN. Pt will benefit from continued PT 1-2x per week to improve headaches,, decrease dizzy symptoms, and improve QoL.     Goals:     Short term goals:  1. Pt will be compliant with home log documenting BP and blood glucose with symptoms. progressing-pt not logging and does not believe related, may dc if unable to encourage for differential of symptoms  2. Pt will be compliant with HEP for improving strength, balance, and decreasing symptoms. Progressing  3. Pt will complete most appropriate balance assessment. Progressing-once tolerated    Short term goal time span:  2-4 weeks    Long term goals:  1. Pt will report dec in head ache frequency to 2x or less per week. progressing  2. Pt will demonstrate subjective improvement with DHI 50% or better. progressing  3. Pt will score low fall risk on appropriate outcome measure. progressing  4. Pt will have negative head impulse test to demonstrate improved gaze stabilization. Progressing     PLAN/RECOMMENDATIONS:   Plan for treatment: therapy treatment to continue next visit.  Planned interventions for next visit: continue with current treatment.

## 2022-09-23 ENCOUNTER — PHYSICAL THERAPY (OUTPATIENT)
Dept: PHYSICAL THERAPY | Facility: REHABILITATION | Age: 52
End: 2022-09-23
Attending: OTOLARYNGOLOGY
Payer: MEDICAID

## 2022-09-23 DIAGNOSIS — R42 DIZZINESS AND GIDDINESS: ICD-10-CM

## 2022-09-23 DIAGNOSIS — M79.12 MYALGIA OF AUXILIARY MUSCLES, HEAD AND NECK: ICD-10-CM

## 2022-09-23 DIAGNOSIS — R51.9 CHRONIC INTRACTABLE HEADACHE, UNSPECIFIED HEADACHE TYPE: ICD-10-CM

## 2022-09-23 DIAGNOSIS — G89.29 CHRONIC INTRACTABLE HEADACHE, UNSPECIFIED HEADACHE TYPE: ICD-10-CM

## 2022-09-23 PROCEDURE — 97112 NEUROMUSCULAR REEDUCATION: CPT

## 2022-09-23 PROCEDURE — 97110 THERAPEUTIC EXERCISES: CPT

## 2022-09-23 NOTE — OP THERAPY DAILY TREATMENT
Outpatient Physical Therapy  DAILY TREATMENT     [facility] Physical [address].  [address]  [place] [address]  Phone:  [phone]  Fax:  [phone]    Date: 09/23/2022    Patient: Laurie Lion  YOB: 1970  MRN: [id number]     Time Calculation    Start time: 0956  Stop time: 1040 Time Calculation (min): 44 minutes         Chief Complaint: Other (Headache ) and Difficulty Walking    Visit #: 3    SUBJECTIVE:  Patient states that she is performing all HEP at home, no questions or concerns. Completing 3 times a day but has not noticed any difference. Felt that STM last session was only helpful for 1 hour following treatment and therefore not a good use of time. Does have a headache today which makes her feel unsteady.     During treatment reported that as HA increases, notices an increase in unsteadiness.     OBJECTIVE:  Current objective measures:   Dynamic Gait Index  Gait level surface: 1  Change in gait speed: 1  Gait with horizontal head turns: 1  Gait with vertical head turns: 1  Gait and pivot turn: 0  Step over obstacle: 0  Step around obstacle: 2  Steps: 1    Total: 7/24    MCTSIB  Condition 1: 242% below normal   Condition 2: 286% below normal  Condition 3: 153% below normal  Condition 4: 117% below normal (3 falls)  Total: 183% below normal           Therapeutic Exercises (CPT 34354):     1. supine chin tuck, Review    2. seated upper trap stretch, Review    3. median nerve flossing, Review    4. ulnar nerve flossing, Review    20. 8/10-10/5      Therapeutic Exercise Summary: Access Code: [email or web address]  URL: [email or web address]  Date: 09/12/2022  Prepared by:    Exercises  Supine Chin Tuck - 1 x daily - 7 x weekly - 3 sets - 10 reps  Seated Gentle Upper Trapezius Stretch - 1 x daily - 7 x weekly - 3 reps - 1 min hold  Median Nerve Flossing - Tray - 1 x daily - 7 x weekly - 3 sets - 5 reps  Ulnar Nerve Flossing - 1 x daily - 7 x weekly - 3 sets - 5  reps      Therapeutic Treatments and Modalities:     1. Neuromuscular Re-education (CPT 79638), DGI assessment, See above for details, MCTSIB assessment, See above for details  Time-based treatments/modalities:    Physical Therapy Timed Treatment Charges  Neuromusc re-ed, balance, coor, post minutes (CPT 06883): 34 minutes  Therapeutic exercise minutes (CPT 36081): 10 minutes      ASSESSMENT:   Response to treatment: Participated in further testing this session to establish objective baseline. Patient classifies as a high fall risk based on DGI, but noted that she presents without use of an AD. She also scored outside normative values on all conditions of the MCTSIB, making it difficult to determine if vestibular system is involved in sensation of unsteadiness. In all conditions noted increased sway posteriorly. Based on these findings and subjective reports, patient would benefit from first addressing cervical impairments/headaches.     PLAN/RECOMMENDATIONS:   Plan for treatment: therapy treatment to continue next visit.  Planned interventions for next visit: continue with current treatment.

## 2022-09-26 ENCOUNTER — PHYSICAL THERAPY (OUTPATIENT)
Dept: PHYSICAL THERAPY | Facility: REHABILITATION | Age: 52
End: 2022-09-26
Attending: OTOLARYNGOLOGY
Payer: MEDICAID

## 2022-09-26 DIAGNOSIS — G89.29 CHRONIC INTRACTABLE HEADACHE, UNSPECIFIED HEADACHE TYPE: ICD-10-CM

## 2022-09-26 DIAGNOSIS — R42 DIZZINESS AND GIDDINESS: ICD-10-CM

## 2022-09-26 DIAGNOSIS — R51.9 CHRONIC INTRACTABLE HEADACHE, UNSPECIFIED HEADACHE TYPE: ICD-10-CM

## 2022-09-26 DIAGNOSIS — M79.12 MYALGIA OF AUXILIARY MUSCLES, HEAD AND NECK: ICD-10-CM

## 2022-09-26 PROCEDURE — 97110 THERAPEUTIC EXERCISES: CPT

## 2022-09-26 NOTE — OP THERAPY DAILY TREATMENT
Outpatient Physical Therapy  DAILY TREATMENT     [facility] Physical Therapy [address]  [address]  [place] [address]  Phone:  [phone]  Fax:  [phone]    Date: 09/26/2022    Patient: Laurie Lion  YOB: 1970  MRN: [id number]     Time Calculation    Start time: 1330  Stop time: 1412 Time Calculation (min): 42 minutes         Chief Complaint: Headache and Vertigo    Visit #: 4    SUBJECTIVE:  Patient states that she is performing all HEP at home and has not noticed any improvement with headache frequency or intensity. Notes she wakes up 4-6x per week in the middle of the night with headaches. Checks blood sugars in the night when waking up with headache and usually 120-140. States planning to follow up with Neurology about headaches/possible mirgraines at [place]. Later states she has a mass in her brain they found on imaging.    OBJECTIVE:    114/76 mmHG Rt UE sitting    Current objective measures:   Dynamic Gait Index  Gait level surface: 1  Change in gait speed: 1  Gait with horizontal head turns: 1  Gait with vertical head turns: 1  Gait and pivot turn: 0  Step over obstacle: 0  Step around obstacle: 2  Steps: 1    Total: 7/24    MCTSIB  Condition 1: 242% below normal   Condition 2: 286% below normal  Condition 3: 153% below normal  Condition 4: 117% below normal (3 falls)  Total: 183% below normal           Therapeutic Exercises (CPT 98747):     1. cervical isometrics all directions, x 8,5 sec holds  mod VC, sig education for spprorpaite muscle contraction to dec concentric    2. cerival rotation stretch towel OP, 5 x 30 sec B    3. seated chin tucks, 2 x 10, 5 sec holds    4. sleeping positions/pillows, BP and BG education for headaches    20. 8/10-10/5      Therapeutic Exercise Summary: Access Code: [email or web address]  URL: [email or web address]  Date: 09/12/2022  Prepared by:    Exercises  Supine Chin Tuck - 1 x daily - 7 x weekly - 3 sets - 10  reps  Seated Gentle Upper Trapezius Stretch - 1 x daily - 7 x weekly - 3 reps - 1 min hold  Median Nerve Flossing - Tray - 1 x daily - 7 x weekly - 3 sets - 5 reps  Ulnar Nerve Flossing - 1 x daily - 7 x weekly - 3 sets - 5 reps    Access Code: [email or web address]  URL: [email or web address]  Date: 09/26/2022  Prepared by:    Exercises  Seated Assisted Cervical Rotation with Towel - 1 x daily - 7 x weekly - 3 reps - 45 sec hold  Seated Isometric Cervical Sidebending - 1 x daily - 7 x weekly - 10 reps  Seated Isometric Cervical Rotation - 1 x daily - 7 x weekly - 10 reps  Seated Isometric Cervical Extension - 1 x daily - 7 x weekly - 10 reps  Seated Isometric Cervical Flexion - 1 x daily - 7 x weekly - 10 reps        Time-based treatments/modalities:    Physical Therapy Timed Treatment Charges  Therapeutic exercise minutes (CPT 25348): 42 minutes      ASSESSMENT:   Response to treatment: Encouraged pt to follow up with Neurology referral related to possible migraines. Education on headache interventions related to cervical strength/flexibility for improving symptoms. Her headaches and instability seem to be very correlated and will cont to assess.   PLAN/RECOMMENDATIONS:   Plan for treatment: therapy treatment to continue next visit.  Planned interventions for next visit: continue with current treatment.

## 2022-10-04 ENCOUNTER — PHYSICAL THERAPY (OUTPATIENT)
Dept: PHYSICAL THERAPY | Facility: REHABILITATION | Age: 52
End: 2022-10-04
Attending: OTOLARYNGOLOGY
Payer: MEDICAID

## 2022-10-04 DIAGNOSIS — R51.9 CHRONIC INTRACTABLE HEADACHE, UNSPECIFIED HEADACHE TYPE: ICD-10-CM

## 2022-10-04 DIAGNOSIS — R42 DIZZINESS AND GIDDINESS: ICD-10-CM

## 2022-10-04 DIAGNOSIS — G89.29 CHRONIC INTRACTABLE HEADACHE, UNSPECIFIED HEADACHE TYPE: ICD-10-CM

## 2022-10-04 PROCEDURE — 97110 THERAPEUTIC EXERCISES: CPT

## 2022-10-04 NOTE — OP THERAPY DAILY TREATMENT
"  Outpatient Physical Therapy  DAILY TREATMENT     [facility] Physical Therapy [address]  [address]  [place] [address]  Phone:  [phone]  Fax:  [phone]    Date: 10/04/2022    Patient: Laurie Lion  YOB: 1970  MRN: [id number]     Time Calculation    Start time: 1330  Stop time: 1350 Time Calculation (min): 20 minutes         Chief Complaint: Other (Headache )    Visit #: 5    SUBJECTIVE:  Patient states that she has noted increased dizziness for the past week, notes that symptoms are constant and at a higher intensity. She states that her doctor has referred her to neurology due to increasing intra-cranial mass, but has not yet scheduled as referral is not in system. She states that she has undergone an ENT appointment but these records are also not in the computer system. States that she is being followed by an oncologist here at [facility] due to intra-cranial mass as well (\"Dr. Austin\" - but no past or future appointments seen in system with MD this name).      Patient is frustrated that these documents are not in [facility]'s system and is further frustrated with the lack of change and worsening of vestibular symptoms. She does not feel that PT has been helpful at this time and would like to just focus on ENT and neurology appointments to discover the root cause of her problem.      OBJECTIVE:  Current objective measures:   Brain MRI 7/12/22:  FINDINGS:     Brain: Normal.  Specifically the internal auditory canals and cerebellopontine angles are normal.   Intracranial flow voids: Normal   Ventricles: Normal   Sinuses: Normal   Mastoid air cells: Normal   Skull: Normal   Other findings: None    *Patient states that a MRI has been performed since above imaging and today, revealing a mass near the ear. States that she is being followed by an oncologist here at [facility].           Therapeutic Exercises (CPT 20890):     1. Review of HEP, Stretching, nerve glides, cervical " retraction, cervical isometrics.    20. 8/10-10/5      Therapeutic Exercise Summary: Access Code: [email or web address]  URL: [email or web address]  Date: 09/12/2022  Prepared by:    Exercises  Supine Chin Tuck - 1 x daily - 7 x weekly - 3 sets - 10 reps  Seated Gentle Upper Trapezius Stretch - 1 x daily - 7 x weekly - 3 reps - 1 min hold  Median Nerve Flossing - Tray - 1 x daily - 7 x weekly - 3 sets - 5 reps  Ulnar Nerve Flossing - 1 x daily - 7 x weekly - 3 sets - 5 reps    Access Code: [email or web address]  URL: [email or web address]  Date: 09/26/2022  Prepared by:    Exercises  Seated Assisted Cervical Rotation with Towel - 1 x daily - 7 x weekly - 3 reps - 45 sec hold  Seated Isometric Cervical Sidebending - 1 x daily - 7 x weekly - 10 reps  Seated Isometric Cervical Rotation - 1 x daily - 7 x weekly - 10 reps  Seated Isometric Cervical Extension - 1 x daily - 7 x weekly - 10 reps  Seated Isometric Cervical Flexion - 1 x daily - 7 x weekly - 10 reps        Time-based treatments/modalities:    Physical Therapy Timed Treatment Charges  Therapeutic exercise minutes (CPT 49705): 20 minutes      ASSESSMENT:   Response to treatment: Patient has participated in 5 physical therapy sessions to address bilateral upper extremity pain, dizziness, and headaches. She notes decrease in pain in her arms but worsening of headache and dizziness. 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 Documentation of this mass was not located and therefore, unable to provide education to patient about this diagnosis. Recommended follow up with her medical team including PCP, neurologist, oncologist, and ENT to determine if she is appropriate for PT. If so, patient can obtain a new referral to return for vestibular therapy.      PLAN/RECOMMENDATIONS:   Plan for treatment: discharge per patient request  Planned interventions for next visit: N/A

## 2022-10-04 NOTE — OP THERAPY DISCHARGE SUMMARY
Outpatient Physical Therapy  DISCHARGE SUMMARY NOTE      [facility] Physical Therapy [address].  [address]  [place] [address]  Phone:  [phone]  Fax:  [phone]    Date of Visit: 10/04/2022    Patient: Laurie Lion  YOB: 1970  MRN: [id number]     Referring Provider: Aissatou Braden M.D.  [address] [place] [address]   Referring Diagnosis Sensorineural hearing loss, bilateral [H90.3];Dizziness and giddiness [R42];Tinnitus, left ear [H93.12];Myalgia of auxiliary muscles, head and neck [M79.12]         Functional Assessment Used        Your patient is being discharged from Physical Therapy with the following comments:   Per patient request due to no changes in headaches or vestibular symptoms    Comments:  Patient has participated in 5 physical therapy sessions to address bilateral upper extremity pain, dizziness, and headaches. She notes decrease in pain in her arms but worsening of headache and dizziness. 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 Documentation of this mass was not located and therefore, unable to provide education to patient about this diagnosis. Recommended follow up with her medical team including PCP, neurologist, oncologist, and ENT to determine if she is appropriate for PT. If so, patient can obtain a new referral to return for vestibular therapy.      Emy Ellis, PT, DPT    Date: 10/4/2022

## 2022-10-10 ENCOUNTER — APPOINTMENT (OUTPATIENT)
Dept: PHYSICAL THERAPY | Facility: REHABILITATION | Age: 52
End: 2022-10-10
Attending: OTOLARYNGOLOGY
Payer: MEDICAID

## 2022-10-17 ENCOUNTER — APPOINTMENT (OUTPATIENT)
Dept: PHYSICAL THERAPY | Facility: REHABILITATION | Age: 52
End: 2022-10-17
Attending: OTOLARYNGOLOGY
Payer: MEDICAID

## 2022-10-25 ENCOUNTER — APPOINTMENT (OUTPATIENT)
Dept: PHYSICAL THERAPY | Facility: REHABILITATION | Age: 52
End: 2022-10-25
Attending: OTOLARYNGOLOGY
Payer: MEDICAID

## 2022-12-20 ENCOUNTER — PRE-ADMISSION TESTING (OUTPATIENT)
Dept: ADMISSIONS | Facility: MEDICAL CENTER | Age: 52
End: 2022-12-20
Attending: SURGERY
Payer: MEDICAID

## 2022-12-20 DIAGNOSIS — Z01.810 PRE-OPERATIVE CARDIOVASCULAR EXAMINATION: ICD-10-CM

## 2022-12-20 DIAGNOSIS — Z01.812 PRE-OPERATIVE LABORATORY EXAMINATION: ICD-10-CM

## 2022-12-20 LAB
ANION GAP SERPL CALC-SCNC: 10 MMOL/L (ref 7–16)
BUN SERPL-MCNC: 16 MG/DL (ref 8–22)
CALCIUM SERPL-MCNC: 9.9 MG/DL (ref 8.5–10.5)
CHLORIDE SERPL-SCNC: 104 MMOL/L (ref 96–112)
CO2 SERPL-SCNC: 25 MMOL/L (ref 20–33)
CREAT SERPL-MCNC: 0.53 MG/DL (ref 0.5–1.4)
EKG IMPRESSION: NORMAL
EST. AVERAGE GLUCOSE BLD GHB EST-MCNC: 143 MG/DL
GFR SERPLBLD CREATININE-BSD FMLA CKD-EPI: 111 ML/MIN/1.73 M 2
GLUCOSE SERPL-MCNC: 120 MG/DL (ref 65–99)
HBA1C MFR BLD: 6.6 % (ref 4–5.6)
POTASSIUM SERPL-SCNC: 4.1 MMOL/L (ref 3.6–5.5)
SODIUM SERPL-SCNC: 139 MMOL/L (ref 135–145)

## 2022-12-20 PROCEDURE — 93005 ELECTROCARDIOGRAM TRACING: CPT

## 2022-12-20 PROCEDURE — 80048 BASIC METABOLIC PNL TOTAL CA: CPT

## 2022-12-20 PROCEDURE — 36415 COLL VENOUS BLD VENIPUNCTURE: CPT

## 2022-12-20 PROCEDURE — 93010 ELECTROCARDIOGRAM REPORT: CPT | Performed by: INTERNAL MEDICINE

## 2022-12-20 PROCEDURE — 83036 HEMOGLOBIN GLYCOSYLATED A1C: CPT

## 2022-12-20 RX ORDER — SULFASALAZINE 500 MG/1
500 TABLET ORAL 2 TIMES DAILY
COMMUNITY
Start: 2022-10-21

## 2022-12-20 RX ORDER — AMITRIPTYLINE HYDROCHLORIDE 25 MG/1
25 TABLET, FILM COATED ORAL DAILY
COMMUNITY
Start: 2022-12-14

## 2022-12-20 ASSESSMENT — FIBROSIS 4 INDEX: FIB4 SCORE: 0.86

## 2022-12-21 ENCOUNTER — ANESTHESIA EVENT (OUTPATIENT)
Dept: SURGERY | Facility: MEDICAL CENTER | Age: 52
End: 2022-12-21
Payer: MEDICAID

## 2022-12-21 ENCOUNTER — HOSPITAL ENCOUNTER (OUTPATIENT)
Facility: MEDICAL CENTER | Age: 52
End: 2022-12-21
Attending: SURGERY | Admitting: SURGERY
Payer: MEDICAID

## 2022-12-21 ENCOUNTER — ANESTHESIA (OUTPATIENT)
Dept: SURGERY | Facility: MEDICAL CENTER | Age: 52
End: 2022-12-21
Payer: MEDICAID

## 2022-12-21 VITALS
RESPIRATION RATE: 18 BRPM | SYSTOLIC BLOOD PRESSURE: 123 MMHG | BODY MASS INDEX: 29.69 KG/M2 | HEART RATE: 86 BPM | WEIGHT: 167.55 LBS | DIASTOLIC BLOOD PRESSURE: 66 MMHG | HEIGHT: 63 IN | OXYGEN SATURATION: 94 % | TEMPERATURE: 97.6 F

## 2022-12-21 LAB
GLUCOSE BLD STRIP.AUTO-MCNC: 103 MG/DL (ref 65–99)
PATHOLOGY CONSULT NOTE: NORMAL

## 2022-12-21 PROCEDURE — 160035 HCHG PACU - 1ST 60 MINS PHASE I: Performed by: SURGERY

## 2022-12-21 PROCEDURE — 160009 HCHG ANES TIME/MIN: Performed by: SURGERY

## 2022-12-21 PROCEDURE — 88304 TISSUE EXAM BY PATHOLOGIST: CPT | Mod: 59

## 2022-12-21 PROCEDURE — 700101 HCHG RX REV CODE 250: Performed by: SURGERY

## 2022-12-21 PROCEDURE — 700101 HCHG RX REV CODE 250: Performed by: ANESTHESIOLOGY

## 2022-12-21 PROCEDURE — 160048 HCHG OR STATISTICAL LEVEL 1-5: Performed by: SURGERY

## 2022-12-21 PROCEDURE — 700102 HCHG RX REV CODE 250 W/ 637 OVERRIDE(OP): Performed by: ANESTHESIOLOGY

## 2022-12-21 PROCEDURE — A9270 NON-COVERED ITEM OR SERVICE: HCPCS | Performed by: ANESTHESIOLOGY

## 2022-12-21 PROCEDURE — 700111 HCHG RX REV CODE 636 W/ 250 OVERRIDE (IP): Performed by: ANESTHESIOLOGY

## 2022-12-21 PROCEDURE — 700105 HCHG RX REV CODE 258: Performed by: SURGERY

## 2022-12-21 PROCEDURE — 160047 HCHG PACU  - EA ADDL 30 MINS PHASE II: Performed by: SURGERY

## 2022-12-21 PROCEDURE — 160046 HCHG PACU - 1ST 60 MINS PHASE II: Performed by: SURGERY

## 2022-12-21 PROCEDURE — 160025 RECOVERY II MINUTES (STATS): Performed by: SURGERY

## 2022-12-21 PROCEDURE — 82962 GLUCOSE BLOOD TEST: CPT

## 2022-12-21 PROCEDURE — 00700 ANES PX UPR ANT ABD WALL NOS: CPT | Performed by: ANESTHESIOLOGY

## 2022-12-21 PROCEDURE — 160039 HCHG SURGERY MINUTES - EA ADDL 1 MIN LEVEL 3: Performed by: SURGERY

## 2022-12-21 PROCEDURE — 160002 HCHG RECOVERY MINUTES (STAT): Performed by: SURGERY

## 2022-12-21 PROCEDURE — 160028 HCHG SURGERY MINUTES - 1ST 30 MINS LEVEL 3: Performed by: SURGERY

## 2022-12-21 RX ORDER — SODIUM CHLORIDE, SODIUM LACTATE, POTASSIUM CHLORIDE, CALCIUM CHLORIDE 600; 310; 30; 20 MG/100ML; MG/100ML; MG/100ML; MG/100ML
INJECTION, SOLUTION INTRAVENOUS CONTINUOUS
Status: ACTIVE | OUTPATIENT
Start: 2022-12-21 | End: 2022-12-21

## 2022-12-21 RX ORDER — LIDOCAINE HYDROCHLORIDE 20 MG/ML
INJECTION, SOLUTION EPIDURAL; INFILTRATION; INTRACAUDAL; PERINEURAL PRN
Status: DISCONTINUED | OUTPATIENT
Start: 2022-12-21 | End: 2022-12-21 | Stop reason: SURG

## 2022-12-21 RX ORDER — HYDRALAZINE HYDROCHLORIDE 20 MG/ML
5 INJECTION INTRAMUSCULAR; INTRAVENOUS
Status: DISCONTINUED | OUTPATIENT
Start: 2022-12-21 | End: 2022-12-21 | Stop reason: HOSPADM

## 2022-12-21 RX ORDER — HYDROMORPHONE HYDROCHLORIDE 1 MG/ML
0.5 INJECTION, SOLUTION INTRAMUSCULAR; INTRAVENOUS; SUBCUTANEOUS
Status: DISCONTINUED | OUTPATIENT
Start: 2022-12-21 | End: 2022-12-21 | Stop reason: HOSPADM

## 2022-12-21 RX ORDER — HYDROMORPHONE HYDROCHLORIDE 1 MG/ML
0.4 INJECTION, SOLUTION INTRAMUSCULAR; INTRAVENOUS; SUBCUTANEOUS
Status: DISCONTINUED | OUTPATIENT
Start: 2022-12-21 | End: 2022-12-21 | Stop reason: HOSPADM

## 2022-12-21 RX ORDER — DEXAMETHASONE SODIUM PHOSPHATE 4 MG/ML
INJECTION, SOLUTION INTRA-ARTICULAR; INTRALESIONAL; INTRAMUSCULAR; INTRAVENOUS; SOFT TISSUE PRN
Status: DISCONTINUED | OUTPATIENT
Start: 2022-12-21 | End: 2022-12-21 | Stop reason: SURG

## 2022-12-21 RX ORDER — ONDANSETRON 2 MG/ML
4 INJECTION INTRAMUSCULAR; INTRAVENOUS
Status: DISCONTINUED | OUTPATIENT
Start: 2022-12-21 | End: 2022-12-21 | Stop reason: HOSPADM

## 2022-12-21 RX ORDER — CEFAZOLIN SODIUM 1 G/3ML
INJECTION, POWDER, FOR SOLUTION INTRAMUSCULAR; INTRAVENOUS PRN
Status: DISCONTINUED | OUTPATIENT
Start: 2022-12-21 | End: 2022-12-21 | Stop reason: SURG

## 2022-12-21 RX ORDER — HYDROMORPHONE HYDROCHLORIDE 1 MG/ML
0.2 INJECTION, SOLUTION INTRAMUSCULAR; INTRAVENOUS; SUBCUTANEOUS
Status: DISCONTINUED | OUTPATIENT
Start: 2022-12-21 | End: 2022-12-21 | Stop reason: HOSPADM

## 2022-12-21 RX ORDER — OXYCODONE HCL 5 MG/5 ML
10 SOLUTION, ORAL ORAL
Status: COMPLETED | OUTPATIENT
Start: 2022-12-21 | End: 2022-12-21

## 2022-12-21 RX ORDER — BUPIVACAINE HYDROCHLORIDE AND EPINEPHRINE 5; 5 MG/ML; UG/ML
INJECTION, SOLUTION EPIDURAL; INTRACAUDAL; PERINEURAL
Status: DISCONTINUED | OUTPATIENT
Start: 2022-12-21 | End: 2022-12-21 | Stop reason: HOSPADM

## 2022-12-21 RX ORDER — ONDANSETRON 2 MG/ML
INJECTION INTRAMUSCULAR; INTRAVENOUS PRN
Status: DISCONTINUED | OUTPATIENT
Start: 2022-12-21 | End: 2022-12-21 | Stop reason: SURG

## 2022-12-21 RX ORDER — ACETAMINOPHEN 500 MG
1000 TABLET ORAL ONCE
Status: COMPLETED | OUTPATIENT
Start: 2022-12-21 | End: 2022-12-21

## 2022-12-21 RX ORDER — MEPERIDINE HYDROCHLORIDE 25 MG/ML
12.5 INJECTION INTRAMUSCULAR; INTRAVENOUS; SUBCUTANEOUS
Status: DISCONTINUED | OUTPATIENT
Start: 2022-12-21 | End: 2022-12-21 | Stop reason: HOSPADM

## 2022-12-21 RX ORDER — CELECOXIB 200 MG/1
200 CAPSULE ORAL ONCE
Status: COMPLETED | OUTPATIENT
Start: 2022-12-21 | End: 2022-12-21

## 2022-12-21 RX ORDER — HALOPERIDOL 5 MG/ML
1 INJECTION INTRAMUSCULAR
Status: DISCONTINUED | OUTPATIENT
Start: 2022-12-21 | End: 2022-12-21 | Stop reason: HOSPADM

## 2022-12-21 RX ORDER — OXYCODONE HCL 5 MG/5 ML
5 SOLUTION, ORAL ORAL
Status: COMPLETED | OUTPATIENT
Start: 2022-12-21 | End: 2022-12-21

## 2022-12-21 RX ORDER — GABAPENTIN 300 MG/1
300 CAPSULE ORAL ONCE
Status: COMPLETED | OUTPATIENT
Start: 2022-12-21 | End: 2022-12-21

## 2022-12-21 RX ORDER — DIPHENHYDRAMINE HYDROCHLORIDE 50 MG/ML
12.5 INJECTION INTRAMUSCULAR; INTRAVENOUS
Status: DISCONTINUED | OUTPATIENT
Start: 2022-12-21 | End: 2022-12-21 | Stop reason: HOSPADM

## 2022-12-21 RX ORDER — BUPIVACAINE HYDROCHLORIDE AND EPINEPHRINE 5; 5 MG/ML; UG/ML
INJECTION, SOLUTION EPIDURAL; INTRACAUDAL; PERINEURAL
Status: DISCONTINUED
Start: 2022-12-21 | End: 2022-12-21 | Stop reason: HOSPADM

## 2022-12-21 RX ORDER — SODIUM CHLORIDE, SODIUM LACTATE, POTASSIUM CHLORIDE, CALCIUM CHLORIDE 600; 310; 30; 20 MG/100ML; MG/100ML; MG/100ML; MG/100ML
INJECTION, SOLUTION INTRAVENOUS CONTINUOUS
Status: DISCONTINUED | OUTPATIENT
Start: 2022-12-21 | End: 2022-12-21 | Stop reason: HOSPADM

## 2022-12-21 RX ORDER — MIDAZOLAM HYDROCHLORIDE 1 MG/ML
INJECTION INTRAMUSCULAR; INTRAVENOUS PRN
Status: DISCONTINUED | OUTPATIENT
Start: 2022-12-21 | End: 2022-12-21 | Stop reason: SURG

## 2022-12-21 RX ADMIN — CELECOXIB 200 MG: 200 CAPSULE ORAL at 14:00

## 2022-12-21 RX ADMIN — OXYCODONE HYDROCHLORIDE 10 MG: 5 SOLUTION ORAL at 15:38

## 2022-12-21 RX ADMIN — ACETAMINOPHEN 1000 MG: 500 TABLET ORAL at 14:00

## 2022-12-21 RX ADMIN — MIDAZOLAM HYDROCHLORIDE 2 MG: 1 INJECTION, SOLUTION INTRAMUSCULAR; INTRAVENOUS at 14:19

## 2022-12-21 RX ADMIN — EPHEDRINE SULFATE 10 MG: 50 INJECTION, SOLUTION INTRAVENOUS at 14:33

## 2022-12-21 RX ADMIN — CEFAZOLIN 2 G: 330 INJECTION, POWDER, FOR SOLUTION INTRAMUSCULAR; INTRAVENOUS at 14:19

## 2022-12-21 RX ADMIN — LIDOCAINE HYDROCHLORIDE 100 MG: 20 INJECTION, SOLUTION EPIDURAL; INFILTRATION; INTRACAUDAL at 14:22

## 2022-12-21 RX ADMIN — ONDANSETRON 4 MG: 2 INJECTION INTRAMUSCULAR; INTRAVENOUS at 14:48

## 2022-12-21 RX ADMIN — DEXAMETHASONE SODIUM PHOSPHATE 4 MG: 4 INJECTION, SOLUTION INTRA-ARTICULAR; INTRALESIONAL; INTRAMUSCULAR; INTRAVENOUS; SOFT TISSUE at 14:25

## 2022-12-21 RX ADMIN — FENTANYL CITRATE 100 MCG: 50 INJECTION, SOLUTION INTRAMUSCULAR; INTRAVENOUS at 14:22

## 2022-12-21 RX ADMIN — GABAPENTIN 300 MG: 300 CAPSULE ORAL at 14:01

## 2022-12-21 RX ADMIN — SODIUM CHLORIDE, POTASSIUM CHLORIDE, SODIUM LACTATE AND CALCIUM CHLORIDE: 600; 310; 30; 20 INJECTION, SOLUTION INTRAVENOUS at 14:03

## 2022-12-21 RX ADMIN — PROPOFOL 150 MG: 10 INJECTION, EMULSION INTRAVENOUS at 14:22

## 2022-12-21 ASSESSMENT — FIBROSIS 4 INDEX: FIB4 SCORE: 0.86

## 2022-12-21 ASSESSMENT — PAIN DESCRIPTION - PAIN TYPE
TYPE: SURGICAL PAIN

## 2022-12-21 ASSESSMENT — PAIN SCALES - GENERAL: PAIN_LEVEL: 5

## 2022-12-21 NOTE — ANESTHESIA PREPROCEDURE EVALUATION
Case: [id number] Date/Time: 12/21/22 1545    Procedure: EXCISION OF LIPOMAS UPPER ABDOMEN X3    Pre-op diagnosis: LIPOMA OF SKIN AND SUBCUTANEOUS TISSUE OF TRUNK    Location: [facility] ROOM 23 / SURGERY SAME DAY [facility]    Surgeons: Dottie Paulino M.D.        DM type 2  Denies: MI/CHF/smoking/CVA/CKD    Physical Exam    Airway   Mallampati: II  TM distance: >3 FB  Neck ROM: full       Cardiovascular - normal exam  Rhythm: regular  Rate: normal  (-) murmur     Dental - normal exam           Pulmonary - normal exam  Breath sounds clear to auscultation     Abdominal    Neurological - normal exam                 Anesthesia Plan    ASA 2       Plan - general       Airway plan will be LMA          Induction: intravenous    Postoperative Plan: Postoperative administration of opioids is intended.    Pertinent diagnostic labs and testing reviewed    Informed Consent:    Anesthetic plan and risks discussed with patient.    Use of blood products discussed with: patient whom consented to blood products.

## 2022-12-21 NOTE — ANESTHESIA PROCEDURE NOTES
Airway    Date/Time: 12/21/2022 2:23 PM  Performed by: Josue Gu M.D.  Authorized by: Josue Gu M.D.     Location:  OR  Urgency:  Elective  Indications for Airway Management:  Anesthesia      Spontaneous Ventilation: absent    Sedation Level:  Deep  Preoxygenated: Yes    Final Airway Type:  Supraglottic airway  Final Supraglottic Airway:  Standard LMA    SGA Size:  3  Number of Attempts at Approach:  1   Atraumatic insertion, good seal

## 2022-12-21 NOTE — DISCHARGE SUMMARY
Discharge Instructions:    Care of Your Incisions:   Leave the bandages on for 48 hours. You can shower with the dressing on as it is waterproof.  Avoid getting lotions, powders or deodorant on the incision while it is healing.   You may have thin paper strips across the incision. These are called Steri-Strips. When they start to curl at the edges, you can peel them off. It is okay to shower with these on.   Don’t worry if the area under either incision feels firm or hard. This is normal and usually softens within a few months.    How to Manage Discomfort After Surgery:   It is normal to have tenderness, discomfort or mild swelling at the site of the incisions.     You may also feel:  - Numbness at the incisions.     You will be given a prescription for pain medication prior to being discharged from the hospital. If you are having pain, take the medication as directed by your physician and by the label directions. You should be comfortable and moving around. Most people use some prescription pain medication, though some need only over the counter pain medication, such as ibuprofen (Motrin) or acetaminophen (Tylenol). Some women have little pain and take nothing at all. Whatever amount of pain you have, it is important to listen to your body and use the medication if needed during your recovery.     Prescription pain medication may cause constipation. If you are having problems, use what you normally would or call your nurse for suggestions. It also helps to stay regular by including fiber in your diet (for example: bran or fruits and vegetables) and drink plenty of liquids (water, juice, etc.).    Activity:   You may resume your normal routine, but avoid heavy lifting (over 10 pounds), pushing or pulling until your first post-operative visit, unless otherwise specified by your surgeon.   Do not drive for at least 24-48 hours after surgery (unless otherwise directed by your surgeon), or while you are taking  prescription pain medicine. Prescription pain medicine causes drowsiness (makes you sleepy) so you should avoid doing any tasks where you should be awake and alert (driving, operating machinery, etc).    When to Call Your Doctor/Nurse:   If you have a fever greater than 100.5, increased pain, redness or warmth at the area around the incision, drainage from the incision or around the drain. Call Dr. Paulino's office at [phone] with any other questions or concerns.    You should have an appointment to see Dr. Paulino about a week after surgery, call [phone] if you do not already have an appointment.    Office address:  [address] [place] [address] [address]  [place] [address]      Dottie Paulino M.D.  [facility]  [phone]

## 2022-12-21 NOTE — OP REPORT
Operative Report    Date: 12/21/2022    Surgeon: Dottie Paulino M.D.    Assistant: LOY Sandoval    Anesthesiologist: Brittanie INIGUEZ    Pre-operative Diagnosis:  abdominal wall masses x3     Post-operative Diagnosis: same     Procedure: excision subcutaneous mass, 2 cm x 2 cm x3    Findings: three fatty masses, right upper abdomen, left upper abdomen x2    Procedure in detail: The patient was identified in the pre-operative holding area and brought to the operating room. Correct side and site were identified. Pre-operative antibiotics of ancef were administered prior to the procedure. Anesthesia was smoothly induced.The patient was then prepped and draped in the usual sterile fashion.    The skin was infiltrated with local anesthetic and a curvilinear incision was made over the mass in the right upper abdomen.  The subcutaneous tissue was grasped and the mass excised using cautery. The specimen was then completely removed and was sent for permanent pathology. Meticulous hemostasis was achieved with electrocautery. The area was irrigated and suctioned. The skin was closed in two layers with vicryl and monocryl. Sterile dressings were applied.     The skin was infiltrated with local anesthetic and a curvilinear incision was made over the mass in the right upper abdomen.  The subcutaneous tissue was grasped and the mass excised using cautery. The specimen was then completely removed and was sent for permanent pathology. Meticulous hemostasis was achieved with electrocautery.     Therough the same incision the third mass was encountered. The subcutaneous tissue was grasped and the mass excised using cautery. The specimen was then completely removed and was sent for permanent pathology. Meticulous hemostasis was achieved with electrocautery. The area was irrigated and suctioned. The skin was closed in two layers with vicryl and monocryl. Sterile dressings were applied.     The patient was awakened from anesthetic, and  was taken to the recovery room in stable condition.    Sponge and needle counts were correct at the end of the case.     Specimen:   Right upper abdominal mass  Left upper abdominal mass, inferior  Left upper abdominal mass, superior    EBL: minimal    Dispo: stable, extubated, to PACU    Dottie Paulino M.D.  [facility]  [phone]

## 2022-12-22 NOTE — ANESTHESIA TIME REPORT
Anesthesia Start and Stop Event Times     Date Time Event    12/21/2022 1353 Ready for Procedure     1419 Anesthesia Start     1501 Anesthesia Stop        Responsible Staff  12/21/22    Name Role Begin End    Josue Gu M.D. Anesth 1419 1501        Overtime Reason:  no overtime (within assigned shift)    Comments:

## 2022-12-22 NOTE — OR NURSING
1327 Arrived from OR. Identified appropriately, 6L 02 mask to OPA. Respirations even and non-labored. Surgical sites clean dry intact. Vital signs stable.     1448 updated  plan of care.     1538 oxycodone given for pain as ordered.     1600 patient tolerating po fluids.     1728 Discharge instructions given to patient and family both verbalize understanding of the orders. Copy of instructions given to patients wife.   1735 patient escorted via w/c to responsible adult with all personal belongings.

## 2022-12-22 NOTE — DISCHARGE INSTRUCTIONS
If any questions arise, call your provider.  If your provider is not available, please feel free to call the Surgical Center at [phone].    MEDICATIONS: Resume taking daily medication.  Take prescribed pain medication with food.  If no medication is prescribed, you may take non-aspirin pain medication if needed.  PAIN MEDICATION CAN BE VERY CONSTIPATING.  Take a stool softener or laxative such as senokot, pericolace, or milk of magnesia if needed  Last pain medication Tylenol, celebrex and Gabapentin at 2:00 PM next dose of tylenol if needed 8:00 PM oxycodone given at 3:38 PM  Discharge Instructions:     Care of Your Incisions:   Leave the bandages on for 48 hours. You can shower with the dressing on as it is waterproof.  Avoid getting lotions, powders or deodorant on the incision while it is healing.   You may have thin paper strips across the incision. These are called Steri-Strips. When they start to curl at the edges, you can peel them off. It is okay to shower with these on.   Don’t worry if the area under either incision feels firm or hard. This is normal and usually softens within a few months.     How to Manage Discomfort After Surgery:   It is normal to have tenderness, discomfort or mild swelling at the site of the incisions.      You may also feel:  - Numbness at the incisions.      You will be given a prescription for pain medication prior to being discharged from the hospital. If you are having pain, take the medication as directed by your physician and by the label directions. You should be comfortable and moving around. Most people use some prescription pain medication, though some need only over the counter pain medication, such as ibuprofen (Motrin) or acetaminophen (Tylenol). Some women have little pain and take nothing at all. Whatever amount of pain you have, it is important to listen to your body and use the medication if needed during your recovery.      Prescription pain medication may  cause constipation. If you are having problems, use what you normally would or call your nurse for suggestions. It also helps to stay regular by including fiber in your diet (for example: bran or fruits and vegetables) and drink plenty of liquids (water, juice, etc.).     Activity:   You may resume your normal routine, but avoid heavy lifting (over 10 pounds), pushing or pulling until your first post-operative visit, unless otherwise specified by your surgeon.   Do not drive for at least 24-48 hours after surgery (unless otherwise directed by your surgeon), or while you are taking prescription pain medicine. Prescription pain medicine causes drowsiness (makes you sleepy) so you should avoid doing any tasks where you should be awake and alert (driving, operating machinery, etc).     When to Call Your Doctor/Nurse:   If you have a fever greater than 100.5, increased pain, redness or warmth at the area around the incision, drainage from the incision or around the drain. Call Dr. Paulino's office at [phone] with any other questions or concerns.     You should have an appointment to see Dr. Paulino about a week after surgery, call [phone] if you do not already have an appointment.     Office address:  [address] [place] [address] [id number]  [place] [address]        Dottie Paulino M.D.  [facility]  [phone]                    Routing History                  What to Expect Post Anesthesia    Rest and take it easy for the first 24 hours.  A responsible adult is recommended to remain with you during that time.  It is normal to feel sleepy.  We encourage you to not do anything that requires balance, judgment or coordination.    FOR 24 HOURS DO NOT:  Drive, operate machinery or run household appliances.  Drink beer or alcoholic beverages.  Make important decisions or sign legal documents.    To avoid nausea, slowly advance diet as tolerated, avoiding spicy or greasy foods for the first day.  Add more substantial food  to your diet according to your provider's instructions.  Babies can be fed formula or breast milk as soon as they are hungry.  INCREASE FLUIDS AND FIBER TO AVOID CONSTIPATION.    MILD FLU-LIKE SYMPTOMS ARE NORMAL.  YOU MAY EXPERIENCE GENERALIZED MUSCLE ACHES, THROAT IRRITATION, HEADACHE AND/OR SOME NAUSEA.

## 2022-12-22 NOTE — ANESTHESIA POSTPROCEDURE EVALUATION
Patient: Laurie Lion    Procedure Summary     Date: 12/21/22 Room / Location: [facility] ROOM 23 / SURGERY SAME DAY [facility]    Anesthesia Start: 1419 Anesthesia Stop: 1501    Procedure: EXCISION OF LIPOMAS UPPER ABDOMEN X3 (Abdomen) Diagnosis: (abdominal wall masses)    Surgeons: Dottie Paulino M.D. Responsible Provider: Josue Gu M.D.    Anesthesia Type: general ASA Status: 2          Final Anesthesia Type: general  Last vitals  BP   Blood Pressure: 133/80    Temp   36.3 °C (97.4 °F)    Pulse   78   Resp   16    SpO2   94 %      Anesthesia Post Evaluation    Patient location during evaluation: PACU  Patient participation: complete - patient participated  Level of consciousness: awake and alert  Pain score: 5    Airway patency: patent  Anesthetic complications: no  Cardiovascular status: hemodynamically stable  Respiratory status: acceptable  Hydration status: euvolemic    PONV: none          No notable events documented.

## 2024-03-22 ENCOUNTER — HOSPITAL ENCOUNTER (EMERGENCY)
Facility: MEDICAL CENTER | Age: 54
End: 2024-03-22
Attending: STUDENT IN AN ORGANIZED HEALTH CARE EDUCATION/TRAINING PROGRAM
Payer: MEDICAID

## 2024-03-22 ENCOUNTER — APPOINTMENT (OUTPATIENT)
Dept: RADIOLOGY | Facility: MEDICAL CENTER | Age: 54
End: 2024-03-22
Attending: STUDENT IN AN ORGANIZED HEALTH CARE EDUCATION/TRAINING PROGRAM
Payer: MEDICAID

## 2024-03-22 VITALS
DIASTOLIC BLOOD PRESSURE: 67 MMHG | HEART RATE: 79 BPM | OXYGEN SATURATION: 94 % | HEIGHT: 63 IN | TEMPERATURE: 97.8 F | SYSTOLIC BLOOD PRESSURE: 115 MMHG | WEIGHT: 160.94 LBS | RESPIRATION RATE: 16 BRPM | BODY MASS INDEX: 28.52 KG/M2

## 2024-03-22 DIAGNOSIS — N12 PYELONEPHRITIS: ICD-10-CM

## 2024-03-22 LAB
ALBUMIN SERPL BCP-MCNC: 4.6 G/DL (ref 3.2–4.9)
ALBUMIN/GLOB SERPL: 1.4 G/DL
ALP SERPL-CCNC: 97 U/L (ref 30–99)
ALT SERPL-CCNC: 11 U/L (ref 2–50)
ANION GAP SERPL CALC-SCNC: 14 MMOL/L (ref 7–16)
APPEARANCE UR: CLEAR
AST SERPL-CCNC: 20 U/L (ref 12–45)
BACTERIA #/AREA URNS HPF: ABNORMAL /HPF
BASOPHILS # BLD AUTO: 0.6 % (ref 0–1.8)
BASOPHILS # BLD: 0.04 K/UL (ref 0–0.12)
BILIRUB SERPL-MCNC: 0.3 MG/DL (ref 0.1–1.5)
BILIRUB UR QL STRIP.AUTO: NEGATIVE
BUN SERPL-MCNC: 13 MG/DL (ref 8–22)
CALCIUM ALBUM COR SERPL-MCNC: 9 MG/DL (ref 8.5–10.5)
CALCIUM SERPL-MCNC: 9.5 MG/DL (ref 8.5–10.5)
CHLORIDE SERPL-SCNC: 104 MMOL/L (ref 96–112)
CO2 SERPL-SCNC: 21 MMOL/L (ref 20–33)
COLOR UR: YELLOW
CREAT SERPL-MCNC: 0.58 MG/DL (ref 0.5–1.4)
EOSINOPHIL # BLD AUTO: 0.07 K/UL (ref 0–0.51)
EOSINOPHIL NFR BLD: 1 % (ref 0–6.9)
EPI CELLS #/AREA URNS HPF: ABNORMAL /HPF
ERYTHROCYTE [DISTWIDTH] IN BLOOD BY AUTOMATED COUNT: 40.3 FL (ref 35.9–50)
GFR SERPLBLD CREATININE-BSD FMLA CKD-EPI: 108 ML/MIN/1.73 M 2
GLOBULIN SER CALC-MCNC: 3.3 G/DL (ref 1.9–3.5)
GLUCOSE SERPL-MCNC: 91 MG/DL (ref 65–99)
GLUCOSE UR STRIP.AUTO-MCNC: NEGATIVE MG/DL
HCG SERPL QL: NEGATIVE
HCT VFR BLD AUTO: 39.5 % (ref 37–47)
HGB BLD-MCNC: 13.5 G/DL (ref 12–16)
HYALINE CASTS #/AREA URNS LPF: ABNORMAL /LPF
IMM GRANULOCYTES # BLD AUTO: 0.02 K/UL (ref 0–0.11)
IMM GRANULOCYTES NFR BLD AUTO: 0.3 % (ref 0–0.9)
KETONES UR STRIP.AUTO-MCNC: NEGATIVE MG/DL
LEUKOCYTE ESTERASE UR QL STRIP.AUTO: NEGATIVE
LIPASE SERPL-CCNC: 22 U/L (ref 11–82)
LYMPHOCYTES # BLD AUTO: 2.78 K/UL (ref 1–4.8)
LYMPHOCYTES NFR BLD: 40.5 % (ref 22–41)
MCH RBC QN AUTO: 29.6 PG (ref 27–33)
MCHC RBC AUTO-ENTMCNC: 34.2 G/DL (ref 32.2–35.5)
MCV RBC AUTO: 86.6 FL (ref 81.4–97.8)
MICRO URNS: ABNORMAL
MONOCYTES # BLD AUTO: 0.37 K/UL (ref 0–0.85)
MONOCYTES NFR BLD AUTO: 5.4 % (ref 0–13.4)
NEUTROPHILS # BLD AUTO: 3.59 K/UL (ref 1.82–7.42)
NEUTROPHILS NFR BLD: 52.2 % (ref 44–72)
NITRITE UR QL STRIP.AUTO: POSITIVE
NRBC # BLD AUTO: 0 K/UL
NRBC BLD-RTO: 0 /100 WBC (ref 0–0.2)
PH UR STRIP.AUTO: 5 [PH] (ref 5–8)
PLATELET # BLD AUTO: 264 K/UL (ref 164–446)
PMV BLD AUTO: 8.8 FL (ref 9–12.9)
POTASSIUM SERPL-SCNC: 3.9 MMOL/L (ref 3.6–5.5)
PROT SERPL-MCNC: 7.9 G/DL (ref 6–8.2)
PROT UR QL STRIP: NEGATIVE MG/DL
RBC # BLD AUTO: 4.56 M/UL (ref 4.2–5.4)
RBC # URNS HPF: ABNORMAL /HPF
RBC UR QL AUTO: NEGATIVE
SODIUM SERPL-SCNC: 139 MMOL/L (ref 135–145)
SP GR UR STRIP.AUTO: 1.02
TROPONIN T SERPL-MCNC: <6 NG/L (ref 6–19)
UROBILINOGEN UR STRIP.AUTO-MCNC: 0.2 MG/DL
WBC # BLD AUTO: 6.9 K/UL (ref 4.8–10.8)
WBC #/AREA URNS HPF: ABNORMAL /HPF

## 2024-03-22 PROCEDURE — 87040 BLOOD CULTURE FOR BACTERIA: CPT | Mod: 91

## 2024-03-22 PROCEDURE — 99285 EMERGENCY DEPT VISIT HI MDM: CPT

## 2024-03-22 PROCEDURE — 81001 URINALYSIS AUTO W/SCOPE: CPT

## 2024-03-22 PROCEDURE — 700111 HCHG RX REV CODE 636 W/ 250 OVERRIDE (IP): Mod: JZ,UD | Performed by: STUDENT IN AN ORGANIZED HEALTH CARE EDUCATION/TRAINING PROGRAM

## 2024-03-22 PROCEDURE — 84703 CHORIONIC GONADOTROPIN ASSAY: CPT

## 2024-03-22 PROCEDURE — 96375 TX/PRO/DX INJ NEW DRUG ADDON: CPT

## 2024-03-22 PROCEDURE — 700105 HCHG RX REV CODE 258: Mod: UD | Performed by: STUDENT IN AN ORGANIZED HEALTH CARE EDUCATION/TRAINING PROGRAM

## 2024-03-22 PROCEDURE — 96374 THER/PROPH/DIAG INJ IV PUSH: CPT | Mod: XU

## 2024-03-22 PROCEDURE — 84484 ASSAY OF TROPONIN QUANT: CPT

## 2024-03-22 PROCEDURE — 74177 CT ABD & PELVIS W/CONTRAST: CPT

## 2024-03-22 PROCEDURE — 93005 ELECTROCARDIOGRAM TRACING: CPT | Performed by: STUDENT IN AN ORGANIZED HEALTH CARE EDUCATION/TRAINING PROGRAM

## 2024-03-22 PROCEDURE — 700117 HCHG RX CONTRAST REV CODE 255: Mod: UD | Performed by: STUDENT IN AN ORGANIZED HEALTH CARE EDUCATION/TRAINING PROGRAM

## 2024-03-22 PROCEDURE — 83690 ASSAY OF LIPASE: CPT

## 2024-03-22 PROCEDURE — 80053 COMPREHEN METABOLIC PANEL: CPT

## 2024-03-22 PROCEDURE — 36415 COLL VENOUS BLD VENIPUNCTURE: CPT

## 2024-03-22 PROCEDURE — 85025 COMPLETE CBC W/AUTO DIFF WBC: CPT

## 2024-03-22 RX ORDER — SODIUM CHLORIDE, SODIUM LACTATE, POTASSIUM CHLORIDE, CALCIUM CHLORIDE 600; 310; 30; 20 MG/100ML; MG/100ML; MG/100ML; MG/100ML
1000 INJECTION, SOLUTION INTRAVENOUS ONCE
Status: COMPLETED | OUTPATIENT
Start: 2024-03-22 | End: 2024-03-22

## 2024-03-22 RX ORDER — MORPHINE SULFATE 4 MG/ML
6 INJECTION INTRAVENOUS ONCE
Status: COMPLETED | OUTPATIENT
Start: 2024-03-22 | End: 2024-03-22

## 2024-03-22 RX ORDER — SULFAMETHOXAZOLE AND TRIMETHOPRIM 800; 160 MG/1; MG/1
1 TABLET ORAL 2 TIMES DAILY
Qty: 20 TABLET | Refills: 0 | Status: ACTIVE | OUTPATIENT
Start: 2024-03-22 | End: 2024-04-01

## 2024-03-22 RX ORDER — CEFTRIAXONE 2 G/1
2000 INJECTION, POWDER, FOR SOLUTION INTRAMUSCULAR; INTRAVENOUS ONCE
Status: COMPLETED | OUTPATIENT
Start: 2024-03-22 | End: 2024-03-22

## 2024-03-22 RX ADMIN — IOHEXOL 97 ML: 350 INJECTION, SOLUTION INTRAVENOUS at 19:13

## 2024-03-22 RX ADMIN — CEFTRIAXONE SODIUM 2000 MG: 2 INJECTION, POWDER, FOR SOLUTION INTRAMUSCULAR; INTRAVENOUS at 19:28

## 2024-03-22 RX ADMIN — SODIUM CHLORIDE, POTASSIUM CHLORIDE, SODIUM LACTATE AND CALCIUM CHLORIDE 1000 ML: 600; 310; 30; 20 INJECTION, SOLUTION INTRAVENOUS at 19:36

## 2024-03-22 RX ADMIN — MORPHINE SULFATE 6 MG: 4 INJECTION, SOLUTION INTRAMUSCULAR; INTRAVENOUS at 18:52

## 2024-03-23 LAB — EKG IMPRESSION: NORMAL

## 2024-03-23 NOTE — ED NOTES
"Chief Complaint   Patient presents with    Abdominal Pain     Right upper radiates to back started yesterday     Pt ambulated to triage c.o right upper abdominal pain radiating side of her back \"tight pain\" since yesterday. Denies n/v/d  "

## 2024-03-23 NOTE — ED PROVIDER NOTES
ED Provider Note    CHIEF COMPLAINT  Chief Complaint   Patient presents with    Abdominal Pain     Right upper radiates to back started yesterday       EXTERNAL RECORDS REVIEWED  Other op note from 12/21/2022 patient had excision of 3 subcutaneous masses in right upper abdomen    HPI/ROS  LIMITATION TO HISTORY     OUTSIDE HISTORIAN(S):      Laurie Lion is a 53 y.o. female who presents with abdominal pain/flank pain.  Patient says over the past 2 to 3 days she has been having worsening right upper quadrant/right flank pain.  Patient says she is felt nauseated but not had vomiting.  Patient denies fever, chills.  Patient denies chest pain, shortness of breath urinary changes, diarrhea or bloody stool.  Patient denies recent trauma.    PAST MEDICAL HISTORY   has a past medical history of Dental disorder (06/01/2012), Diabetes (HCC) (12/20/2022), H. pylori infection, High cholesterol (12/20/2022), Infectious disease (works in school), Osteoporosis (12/20/2022), Pain (03/01/2017), Snoring (12/20/2022), and Urinary incontinence (12/20/2022).    SURGICAL HISTORY   has a past surgical history that includes tubal coagulation laparoscopic bilateral (6/13/2012); hysteroscopy thermal ablation (6/13/2012); other abdominal surgery; mass excision ortho (Right, 3/7/2017); and lipoma excision (N/A, 12/21/2022).    FAMILY HISTORY  Family History   Problem Relation Age of Onset    Hypertension Mother     Heart Disease Father        SOCIAL HISTORY  Social History     Tobacco Use    Smoking status: Never    Smokeless tobacco: Never   Vaping Use    Vaping Use: Never used   Substance and Sexual Activity    Alcohol use: Yes     Comment: social    Drug use: Not Currently    Sexual activity: Yes     Partners: Male       CURRENT MEDICATIONS  Home Medications       Reviewed by Natasha Ernst R.N. (Registered Nurse) on 03/22/24 at 1711  Med List Status: Not Addressed     Medication Last Dose Status   amitriptyline (ELAVIL) 25  "MG Tab  Active   cyclobenzaprine (FLEXERIL) 5 mg tablet  Active   ibuprofen (MOTRIN) 800 MG Tab  Active   meclizine (ANTIVERT) 25 MG Tab  Active   metformin (GLUCOPHAGE) 1000 MG tablet  Active   simvastatin (ZOCOR) 20 MG Tab  Active   sulfaSALAzine (AZULFIDINE) 500 MG Tab  Active                    ALLERGIES  Allergies   Allergen Reactions    Nkda [No Known Drug Allergy]        PHYSICAL EXAM  VITAL SIGNS: /67   Pulse 79   Temp 36.6 °C (97.8 °F) (Temporal)   Resp 16   Ht 1.6 m (5' 3\")   Wt 73 kg (160 lb 15 oz)   LMP 09/15/2014 (Approximate)   SpO2 94%   BMI 28.51 kg/m²    Constitutional: Alert in no apparent distress.  HENT: No signs of trauma, Bilateral external ears normal, Nose normal.   Eyes: Pupils are equal and reactive, Conjunctiva normal, Non-icteric.   Neck: Normal range of motion, No tenderness, Supple, No stridor.   Cardiovascular: Regular rate and rhythm, no murmurs.   Thorax & Lungs: Normal breath sounds, No respiratory distress, No wheezing, No chest tenderness.   Abdomen: Bowel sounds normal, Soft, right upper quadrant tenderness, no rebound tenderness or guarding,   Skin: Warm, Dry, No erythema, No rash.   Back: No bony tenderness, right CVA tenderness  Extremities: Intact distal pulses, No edema, No tenderness, No cyanosis  Musculoskeletal: Good range of motion in all major joints. No tenderness to palpation or major deformities noted.   Neurologic: Alert , Normal motor function, Normal sensory function, No focal deficits noted.       DIAGNOSTIC STUDIES / PROCEDURES  EKG  I have independently interpreted this EKG  Results for orders placed or performed during the hospital encounter of 03/22/24   EKG (NOW)   Result Value Ref Range    Report       [facility] Emergency Dept[facility]    Test Date:  2024-03-22  Pt Name:    MC TRAORE        Department: ER  MRN:        [id number]                      Room:        56  Gender:     Female                       Technician: " [id number]  :        1970                   Requested By:NÉSTOR EVANGELINA GAMBINO OMEGA  Order #:    [id number]                    Reading MD:    Measurements  Intervals                                Axis  Rate:       92                           P:          50  GA:         127                          QRS:        -2  QRSD:       109                          T:          30  QT:         383  QTc:        474    Interpretive Statements  Sinus rhythm  Low voltage, extremity and precordial leads  Compared to ECG 2022 14:51:28  Low QRS voltage now present           LABS  Labs Reviewed   CBC WITH DIFFERENTIAL - Abnormal; Notable for the following components:       Result Value    MPV 8.8 (*)     All other components within normal limits   URINALYSIS - Abnormal; Notable for the following components:    Nitrite Positive (*)     All other components within normal limits   URINE MICROSCOPIC (W/UA) - Abnormal; Notable for the following components:    Bacteria Many (*)     All other components within normal limits   COMP METABOLIC PANEL   LIPASE   HCG QUAL SERUM   TROPONIN   ESTIMATED GFR   BLOOD CULTURE    Narrative:     Per Hospital Policy: Only change Specimen Src: to \"Line\" if  specified by physician order.   BLOOD CULTURE    Narrative:     Per Hospital Policy: Only change Specimen Src: to \"Line\" if  specified by physician order.         RADIOLOGY  I have independently interpreted the diagnostic imaging associated with this visit and am waiting the final reading from the radiologist.   My preliminary interpretation is as follows: No free air  Radiologist interpretation:   CT-ABDOMEN-PELVIS WITH   Final Result      1.  No acute abnormality of the abdomen or pelvis.   2.  Colonic diverticulosis without evidence of diverticulitis.   3.  Status post appendectomy.            COURSE & MEDICAL DECISION MAKING    ED Observation Status?     INITIAL ASSESSMENT, COURSE AND PLAN  Care Narrative: On arrival vital signs within normal " limits.  Patient with mild right upper quadrant pain and CVA tenderness.  Differential includes biliary disease, pyelonephritis, nephrolithiasis, diverticulitis, obstruction.  With patient's history of diabetes hypertension consider atypical ACS will obtain ECG and troponin.  Patient does not have peritoneal signs given previous surgeries tenderness on exam will obtain CT imaging.  Will obtain blood work to assess for signs of pancreatitis, liver disease as well as urinalysis to assess for UTI.    ECG without signs of acute ischemia, troponin normal range.  On reassessment patient reports significant improvement in symptoms.  CT imaging without acute abnormality.  Urinalysis with positive nitrate and many bacteria.  With right-sided CVA tenderness and results of urinalysis we will treat patient for possible pyelonephritis.  Patient does not have signs of biliary dilation or cholecystitis on CT imaging no transaminitis or elevation in lipase.  Patient has not had vital sign abnormalities is able to tolerate p.o. and ambulate without difficulty.  Discussed with patient course of oral antibiotics and strict return precautions.  HYDRATION: Based on the patient's presentation of Dehydration the patient was given IV fluids. IV Hydration was used because oral hydration was not adequate alone. Upon recheck following hydration, the patient was improved.      ADDITIONAL PROBLEM LIST    DISPOSITION AND DISCUSSIONS    Decision tools and prescription drugs considered including, but not limited to: Antibiotics for possible pyelonephritis .    FINAL DIAGNOSIS  1. Pyelonephritis           Electronically signed by: Anival Augustine D.O., 3/22/2024 6:01 PM

## 2024-03-23 NOTE — ED NOTES
"Pt discharged home. IV discontinued and gauze placed, pt in possession of belongings. Pt provided discharge education and information pertaining to medications and follow up appointments. Pt received copy of discharge instructions and verbalized understanding. /67   Pulse 79   Temp 36.6 °C (97.8 °F) (Temporal)   Resp 16   Ht 1.6 m (5' 3\")   Wt 73 kg (160 lb 15 oz)   LMP 09/15/2014 (Approximate)   SpO2 94%   BMI 28.51 kg/m²     "

## 2024-03-23 NOTE — ED NOTES
"Pt reports pain has been on and off for two weeks. Reportedly told triage that she has \"stones in her stomach.\"  "

## 2024-03-23 NOTE — ED NOTES
Pt ambulated to [facility] from the lobby with a steady gait. Pt changed into a gown and placed on the monitor. Agree with triage note. Chart up for ERP.

## 2024-03-23 NOTE — DISCHARGE INSTRUCTIONS
We have given you prescription for antibiotics which you should use as directed over the next 10 days.  If you have uncontrolled pain, vomiting not able to eat or drink you should return to the emergency department.

## 2024-03-27 LAB
BACTERIA BLD CULT: NORMAL
BACTERIA BLD CULT: NORMAL
SIGNIFICANT IND 70042: NORMAL
SIGNIFICANT IND 70042: NORMAL
SITE SITE: NORMAL
SITE SITE: NORMAL
SOURCE SOURCE: NORMAL
SOURCE SOURCE: NORMAL

## (undated) DEVICE — DERMABOND ADVANCED - (12EA/BX)

## (undated) DEVICE — KIT  I.V. START (100EA/CA)

## (undated) DEVICE — GOWN WARMING STANDARD FLEX - (30/CA)

## (undated) DEVICE — SODIUM CHL IRRIGATION 0.9% 1000ML (12EA/CA)

## (undated) DEVICE — SUTURE GENERAL

## (undated) DEVICE — TUBE CONNECTING SUCTION - CLEAR PLASTIC STERILE 72 IN (50EA/CA)

## (undated) DEVICE — SUCTION INSTRUMENT YANKAUER BULBOUS TIP W/O VENT (50EA/CA)

## (undated) DEVICE — SUTURE 0 VICRYL PLUS CT-1 - 36 INCH (36/BX)

## (undated) DEVICE — TUBING CLEARLINK DUO-VENT - C-FLO (48EA/CA)

## (undated) DEVICE — SLEEVE VASO CALF MED - (10PR/CA)

## (undated) DEVICE — KIT ANESTHESIA W/CIRCUIT & 3/LT BAG W/FILTER (20EA/CA)

## (undated) DEVICE — SYRINGE 30 ML LL (56/BX)

## (undated) DEVICE — ELECTRODE DUAL RETURN W/ CORD - (50/PK)

## (undated) DEVICE — BONE WAX (12PK/BX)

## (undated) DEVICE — PADDING CAST 6 IN STERILE - 6 X 4 YDS (24/CA)

## (undated) DEVICE — GLOVE BIOGEL SZ 8.5 SURGICAL PF LTX - (50PR/BX 4BX/CA)

## (undated) DEVICE — SUTURE 4-0 MONOCRYL PLUS PS-2 - 27 INCH (36/BX)

## (undated) DEVICE — LACTATED RINGERS INJ 1000 ML - (14EA/CA 60CA/PF)

## (undated) DEVICE — NEPTUNE 4 PORT MANIFOLD - (20/PK)

## (undated) DEVICE — GLOVE BIOGEL SZ 6.5 SURGICAL PF LTX (50PR/BX 4BX/CA)

## (undated) DEVICE — SENSOR SPO2 NEO LNCS ADHESIVE (20/BX) SEE USER NOTES

## (undated) DEVICE — TOURNIQUET CUFF 34 X 4 ONE PORT DISP - STERILE (10/BX)

## (undated) DEVICE — PACK MAJOR ORTHO - (2EA/CA)

## (undated) DEVICE — CANISTER SUCTION RIGID RED 1500CC (40EA/CA)

## (undated) DEVICE — SET LEADWIRE 5 LEAD BEDSIDE DISPOSABLE ECG (1SET OF 5/EA)

## (undated) DEVICE — TRAY SRGPRP PVP IOD WT PRP - (20/CA)

## (undated) DEVICE — MASK ANESTHESIA ADULT  - (100/CA)

## (undated) DEVICE — GOWN SURGEONS LARGE - (32/CA)

## (undated) DEVICE — GOWN SURGICAL XX-LARGE - (28EA/CA) SIRUS NON REINFORCED

## (undated) DEVICE — SET EXTENSION WITH 2 PORTS (48EA/CA) ***PART #2C8610 IS A SUBSTITUTE*****

## (undated) DEVICE — LEAD SET 6 DISP. EKG NIHON KOHDEN (100EA/CA)

## (undated) DEVICE — KIT ROOM DECONTAMINATION

## (undated) DEVICE — SUTURE 3-0 MONOCRYL PLUS PS-1 - 27 INCH (36/BX)

## (undated) DEVICE — SENSOR OXIMETER ADULT SPO2 RD SET (20EA/BX)

## (undated) DEVICE — ELECTRODE 850 FOAM ADHESIVE - HYDROGEL RADIOTRNSPRNT (50/PK)

## (undated) DEVICE — GLOVE BIOGEL PI INDICATOR SZ 7.0 SURGICAL PF LF - (50/BX 4BX/CA)

## (undated) DEVICE — PROTECTOR ULNA NERVE - (36PR/CA)

## (undated) DEVICE — STERI STRIP COMPOUND BENZOIN - TINCTURE 0.6ML WITH APPLICATOR (40EA/BX)

## (undated) DEVICE — CANNULA W/ SUPPLY TUBING O2 - (50/CA)

## (undated) DEVICE — BANDAGE ELASTIC STERILE VELCRO 6 X 5 YDS (25EA/CA)

## (undated) DEVICE — SLEEVE, VASO, THIGH, MED

## (undated) DEVICE — CANISTER SUCTION 3000ML MECHANICAL FILTER AUTO SHUTOFF MEDI-VAC NONSTERILE LF DISP  (40EA/CA)

## (undated) DEVICE — CLOSURE SKIN STRIP 1/2 X 4 IN - (STERI STRIP) (50/BX 4BX/CA)

## (undated) DEVICE — HEAD HOLDER JUNIOR/ADULT

## (undated) DEVICE — SUTURE 2-0 VICRYL PLUS CT-1 36 (36PK/BX)"

## (undated) DEVICE — MASK OXYGEN VNYL ADLT MED CONC WITH 7 FOOT TUBING  - (50EA/CA)

## (undated) DEVICE — GLOVE BIOGEL INDICATOR SZ 7SURGICAL PF LTX - (50/BX 4BX/CA)

## (undated) DEVICE — SUTURE 3-0 VICRYL PLUS SH - 8X 18 INCH (12/BX)

## (undated) DEVICE — TOWEL STOP TIMEOUT SAFETY FLAG (40EA/CA)

## (undated) DEVICE — GLOVE BIOGEL SZ 8 SURGICAL PF LTX - (50PR/BX 4BX/CA)

## (undated) DEVICE — Device

## (undated) DEVICE — DRAPE SURGICAL U 77X120 - (10/CA)

## (undated) DEVICE — DRAPE LOWER EXTREMETY - (6/CA)

## (undated) DEVICE — TUBE E-T HI-LO CUFF 7.0MM (10EA/PK)

## (undated) DEVICE — TUBING C&T SET FLYING LEADS DRAIN TUBING (10EA/BX)

## (undated) DEVICE — GLOVE BIOGEL ECLIPSE  PF LATEX SIZE 6.5 (50PR/BX)

## (undated) DEVICE — GLOVE BIOGEL INDICATOR SZ 8.5 SURGICAL PF LTX - (50/BX 4BX/CA)